# Patient Record
Sex: FEMALE | Race: BLACK OR AFRICAN AMERICAN | NOT HISPANIC OR LATINO | URBAN - NONMETROPOLITAN AREA
[De-identification: names, ages, dates, MRNs, and addresses within clinical notes are randomized per-mention and may not be internally consistent; named-entity substitution may affect disease eponyms.]

---

## 2018-09-25 PROBLEM — E11.9: Noted: 2018-09-25

## 2018-09-25 PROBLEM — H40.213: Noted: 2019-04-26

## 2018-09-25 PROBLEM — H52.03: Noted: 2017-06-27

## 2018-09-25 PROBLEM — H25.13: Noted: 2018-09-25

## 2019-04-26 ENCOUNTER — IMPORTED ENCOUNTER (OUTPATIENT)
Dept: URBAN - NONMETROPOLITAN AREA CLINIC 1 | Facility: CLINIC | Age: 64
End: 2019-04-26

## 2019-04-26 PROCEDURE — 99212 OFFICE O/P EST SF 10 MIN: CPT

## 2019-04-26 PROCEDURE — 92133 CPTRZD OPH DX IMG PST SGM ON: CPT

## 2019-04-26 NOTE — PATIENT DISCUSSION
TINO - Discussed diagnosis in detail with patient - IOP 07:09 04/26/19 -OCT ONH performed and reviewed w/pt ? possibly worse? OS  - Prevoius PACH: 500 OD and 492 OS - No gtts needed at this time- Continue to monitor every 3 months -refer to Shafranov Cataracts OU - Discussed diagnosis in detail with patient- Discussed signs and symptoms associated - Recommend UV protection - Continue to montior DM sans retinopathy - Dicussed diagnosis in detail with patient - Currently diet controlled per pt- Stressed importance of maintaining strict blood sugar control and how it affects health of the eyes- No BDR noted at this time. - Continue to monitor; 's Notes: MR - June 2018DFE  6/2018VF 6/21/18OCT [facility] 04/26/19Optos/Fundus Photos Gonio 12/28/2018PACH  9/20/18 - Aly ([phone])

## 2019-09-20 ENCOUNTER — IMPORTED ENCOUNTER (OUTPATIENT)
Dept: URBAN - NONMETROPOLITAN AREA CLINIC 1 | Facility: CLINIC | Age: 64
End: 2019-09-20

## 2019-09-20 PROCEDURE — 92020 GONIOSCOPY: CPT

## 2019-09-20 PROCEDURE — 99213 OFFICE O/P EST LOW 20 MIN: CPT

## 2019-09-20 NOTE — PATIENT DISCUSSION
NAG- Discussed diagnosis in detail with patient -IOP today was measured as 12OD and 13OS- No gtts needed at this time-Gonio done today 9-:OD: Narrow approach open to PTM indentation opens to CBB angle is not occludableOS: Narrow approach open to VIOLETA/PTM indentation opens to CBB angle may become occludable reccommend LPI OSCataracts OU - Discussed diagnosis in detail with patient- Discussed signs and symptoms associated - Recommend UV protection - Continue to montior DM sans retinopathy - Dicussed diagnosis in detail with patient - Currently diet controlled per pt- Stressed importance of maintaining strict blood sugar control and how it affects health of the eyes- No BDR noted at this time. - Continue to monitor; 's Notes: MR - June 2018DFE  6/2018VF 6/21/18OCT [facility] [place] 04/26/19Optos/Fundus Photos Gonio 12/28/2018PACH  9/20/18 - Aly ([phone])

## 2019-09-26 PROBLEM — H25.13: Noted: 2019-09-20

## 2019-09-26 PROBLEM — E11.9: Noted: 2019-09-20

## 2019-09-26 PROBLEM — H40.033: Noted: 2019-09-26

## 2019-09-26 PROBLEM — H52.03: Noted: 2019-09-20

## 2020-02-08 ENCOUNTER — HOSPITAL ENCOUNTER (INPATIENT)
Age: 65
LOS: 3 days | Discharge: HOME | DRG: 557 | End: 2020-02-11
Attending: INTERNAL MEDICINE | Admitting: INTERNAL MEDICINE
Payer: MEDICARE

## 2020-02-08 ENCOUNTER — APPOINTMENT (OUTPATIENT)
Dept: GENERAL RADIOLOGY | Age: 65
DRG: 557 | End: 2020-02-08
Attending: HOSPITALIST
Payer: MEDICARE

## 2020-02-08 PROBLEM — M62.82 RHABDOMYOLYSIS: Status: ACTIVE | Noted: 2020-02-08

## 2020-02-08 PROBLEM — N17.9 AKI (ACUTE KIDNEY INJURY) (HCC): Status: ACTIVE | Noted: 2020-02-08

## 2020-02-08 PROBLEM — R50.9 FEBRILE ILLNESS: Status: ACTIVE | Noted: 2020-02-08

## 2020-02-08 PROBLEM — R41.82 ALTERED MENTAL STATUS: Status: ACTIVE | Noted: 2020-02-08

## 2020-02-08 PROBLEM — R41.82 AMS (ALTERED MENTAL STATUS): Status: ACTIVE | Noted: 2020-02-08

## 2020-02-08 LAB
ALBUMIN SERPL-MCNC: 2.6 G/DL (ref 3.4–5)
ALBUMIN SERPL-MCNC: 2.8 G/DL (ref 3.4–5)
ALBUMIN/GLOB SERPL: 0.8 {RATIO} (ref 0.8–1.7)
ALBUMIN/GLOB SERPL: 0.8 {RATIO} (ref 0.8–1.7)
ALP SERPL-CCNC: 114 U/L (ref 45–117)
ALP SERPL-CCNC: 124 U/L (ref 45–117)
ALT SERPL-CCNC: 68 U/L (ref 13–56)
ALT SERPL-CCNC: 69 U/L (ref 13–56)
AMMONIA PLAS-SCNC: <10 UMOL/L (ref 11–32)
ANION GAP SERPL CALC-SCNC: 5 MMOL/L (ref 3–18)
ANION GAP SERPL CALC-SCNC: 7 MMOL/L (ref 3–18)
APPEARANCE UR: CLEAR
ARTERIAL PATENCY WRIST A: YES
AST SERPL-CCNC: 263 U/L (ref 10–38)
AST SERPL-CCNC: 290 U/L (ref 10–38)
BACTERIA URNS QL MICRO: ABNORMAL /HPF
BASE DEFICIT BLD-SCNC: 1 MMOL/L
BASOPHILS # BLD: 0 K/UL (ref 0–0.1)
BASOPHILS NFR BLD: 0 % (ref 0–2)
BDY SITE: ABNORMAL
BILIRUB SERPL-MCNC: 0.2 MG/DL (ref 0.2–1)
BILIRUB SERPL-MCNC: 0.3 MG/DL (ref 0.2–1)
BILIRUB UR QL: NEGATIVE
BODY TEMPERATURE: 98
BUN SERPL-MCNC: 20 MG/DL (ref 7–18)
BUN SERPL-MCNC: 21 MG/DL (ref 7–18)
BUN/CREAT SERPL: 15 (ref 12–20)
BUN/CREAT SERPL: 15 (ref 12–20)
CALCIUM SERPL-MCNC: 8 MG/DL (ref 8.5–10.1)
CALCIUM SERPL-MCNC: 8.2 MG/DL (ref 8.5–10.1)
CHLORIDE SERPL-SCNC: 111 MMOL/L (ref 100–111)
CHLORIDE SERPL-SCNC: 114 MMOL/L (ref 100–111)
CK SERPL-CCNC: 8854 U/L (ref 26–192)
CK SERPL-CCNC: 9276 U/L (ref 26–192)
CK SERPL-CCNC: 9476 U/L (ref 26–192)
CO2 SERPL-SCNC: 23 MMOL/L (ref 21–32)
CO2 SERPL-SCNC: 25 MMOL/L (ref 21–32)
COLOR UR: YELLOW
CREAT SERPL-MCNC: 1.3 MG/DL (ref 0.6–1.3)
CREAT SERPL-MCNC: 1.42 MG/DL (ref 0.6–1.3)
DIFFERENTIAL METHOD BLD: ABNORMAL
EOSINOPHIL # BLD: 0 K/UL (ref 0–0.4)
EOSINOPHIL NFR BLD: 0 % (ref 0–5)
EPITH CASTS URNS QL MICRO: ABNORMAL /LPF (ref 0–5)
ERYTHROCYTE [DISTWIDTH] IN BLOOD BY AUTOMATED COUNT: 14.6 % (ref 11.6–14.5)
GAS FLOW.O2 O2 DELIVERY SYS: ABNORMAL L/MIN
GLOBULIN SER CALC-MCNC: 3.1 G/DL (ref 2–4)
GLOBULIN SER CALC-MCNC: 3.3 G/DL (ref 2–4)
GLUCOSE SERPL-MCNC: 127 MG/DL (ref 74–99)
GLUCOSE SERPL-MCNC: 196 MG/DL (ref 74–99)
GLUCOSE UR STRIP.AUTO-MCNC: 100 MG/DL
HCO3 BLD-SCNC: 23.6 MMOL/L (ref 22–26)
HCT VFR BLD AUTO: 32.4 % (ref 35–45)
HGB BLD-MCNC: 10.4 G/DL (ref 12–16)
HGB UR QL STRIP: NEGATIVE
KETONES UR QL STRIP.AUTO: NEGATIVE MG/DL
LEUKOCYTE ESTERASE UR QL STRIP.AUTO: ABNORMAL
LYMPHOCYTES # BLD: 0.6 K/UL (ref 0.9–3.6)
LYMPHOCYTES NFR BLD: 12 % (ref 21–52)
MCH RBC QN AUTO: 29.5 PG (ref 24–34)
MCHC RBC AUTO-ENTMCNC: 32.1 G/DL (ref 31–37)
MCV RBC AUTO: 91.8 FL (ref 74–97)
MONOCYTES # BLD: 0.2 K/UL (ref 0.05–1.2)
MONOCYTES NFR BLD: 4 % (ref 3–10)
NEUTS SEG # BLD: 4.2 K/UL (ref 1.8–8)
NEUTS SEG NFR BLD: 84 % (ref 40–73)
NITRITE UR QL STRIP.AUTO: NEGATIVE
O2/TOTAL GAS SETTING VFR VENT: 0.21 %
PCO2 BLD: 37.4 MMHG (ref 35–45)
PH BLD: 7.41 [PH] (ref 7.35–7.45)
PH UR STRIP: 7 [PH] (ref 5–8)
PLATELET # BLD AUTO: 174 K/UL (ref 135–420)
PMV BLD AUTO: 10.8 FL (ref 9.2–11.8)
PO2 BLD: 71 MMHG (ref 80–100)
POTASSIUM SERPL-SCNC: 4.3 MMOL/L (ref 3.5–5.5)
POTASSIUM SERPL-SCNC: 4.6 MMOL/L (ref 3.5–5.5)
PROT SERPL-MCNC: 5.7 G/DL (ref 6.4–8.2)
PROT SERPL-MCNC: 6.1 G/DL (ref 6.4–8.2)
PROT UR STRIP-MCNC: NEGATIVE MG/DL
RBC # BLD AUTO: 3.53 M/UL (ref 4.2–5.3)
RBC #/AREA URNS HPF: ABNORMAL /HPF (ref 0–5)
SAO2 % BLD: 95 % (ref 92–97)
SERVICE CMNT-IMP: ABNORMAL
SODIUM SERPL-SCNC: 141 MMOL/L (ref 136–145)
SODIUM SERPL-SCNC: 144 MMOL/L (ref 136–145)
SP GR UR REFRACTOMETRY: 1.02 (ref 1–1.03)
SPECIMEN TYPE: ABNORMAL
T4 FREE SERPL-MCNC: 0.9 NG/DL (ref 0.7–1.5)
TOTAL RESP. RATE, ITRR: 16
TSH SERPL DL<=0.05 MIU/L-ACNC: 0.14 UIU/ML (ref 0.36–3.74)
UROBILINOGEN UR QL STRIP.AUTO: 0.2 EU/DL (ref 0.2–1)
WBC # BLD AUTO: 5 K/UL (ref 4.6–13.2)
WBC URNS QL MICRO: ABNORMAL /HPF (ref 0–4)

## 2020-02-08 PROCEDURE — 85025 COMPLETE CBC W/AUTO DIFF WBC: CPT

## 2020-02-08 PROCEDURE — 80307 DRUG TEST PRSMV CHEM ANLYZR: CPT

## 2020-02-08 PROCEDURE — 74011250636 HC RX REV CODE- 250/636: Performed by: INTERNAL MEDICINE

## 2020-02-08 PROCEDURE — 74011250637 HC RX REV CODE- 250/637: Performed by: PHYSICIAN ASSISTANT

## 2020-02-08 PROCEDURE — 80053 COMPREHEN METABOLIC PANEL: CPT

## 2020-02-08 PROCEDURE — 36600 WITHDRAWAL OF ARTERIAL BLOOD: CPT

## 2020-02-08 PROCEDURE — 74011250636 HC RX REV CODE- 250/636: Performed by: HOSPITALIST

## 2020-02-08 PROCEDURE — 71045 X-RAY EXAM CHEST 1 VIEW: CPT

## 2020-02-08 PROCEDURE — 84439 ASSAY OF FREE THYROXINE: CPT

## 2020-02-08 PROCEDURE — 74011250637 HC RX REV CODE- 250/637: Performed by: INTERNAL MEDICINE

## 2020-02-08 PROCEDURE — 82140 ASSAY OF AMMONIA: CPT

## 2020-02-08 PROCEDURE — 87040 BLOOD CULTURE FOR BACTERIA: CPT

## 2020-02-08 PROCEDURE — 81001 URINALYSIS AUTO W/SCOPE: CPT

## 2020-02-08 PROCEDURE — 36415 COLL VENOUS BLD VENIPUNCTURE: CPT

## 2020-02-08 PROCEDURE — 82803 BLOOD GASES ANY COMBINATION: CPT

## 2020-02-08 PROCEDURE — 74011000258 HC RX REV CODE- 258: Performed by: INTERNAL MEDICINE

## 2020-02-08 PROCEDURE — 82550 ASSAY OF CK (CPK): CPT

## 2020-02-08 PROCEDURE — 84443 ASSAY THYROID STIM HORMONE: CPT

## 2020-02-08 PROCEDURE — 65660000000 HC RM CCU STEPDOWN

## 2020-02-08 RX ORDER — VANCOMYCIN 2 GRAM/500 ML IN 0.9 % SODIUM CHLORIDE INTRAVENOUS
2000 ONCE
Status: COMPLETED | OUTPATIENT
Start: 2020-02-08 | End: 2020-02-08

## 2020-02-08 RX ORDER — DOCUSATE SODIUM 100 MG/1
100 CAPSULE, LIQUID FILLED ORAL
Status: DISCONTINUED | OUTPATIENT
Start: 2020-02-08 | End: 2020-02-11 | Stop reason: HOSPADM

## 2020-02-08 RX ORDER — NALOXONE HYDROCHLORIDE 0.4 MG/ML
0.4 INJECTION, SOLUTION INTRAMUSCULAR; INTRAVENOUS; SUBCUTANEOUS AS NEEDED
Status: DISCONTINUED | OUTPATIENT
Start: 2020-02-08 | End: 2020-02-11 | Stop reason: HOSPADM

## 2020-02-08 RX ORDER — TRAMADOL HYDROCHLORIDE 50 MG/1
50 TABLET ORAL
Status: DISCONTINUED | OUTPATIENT
Start: 2020-02-08 | End: 2020-02-11 | Stop reason: HOSPADM

## 2020-02-08 RX ORDER — QUETIAPINE FUMARATE 100 MG/1
100 TABLET, FILM COATED ORAL
COMMUNITY

## 2020-02-08 RX ORDER — DEXAMETHASONE SODIUM PHOSPHATE 100 MG/10ML
14 INJECTION INTRAMUSCULAR; INTRAVENOUS EVERY 6 HOURS
Status: DISCONTINUED | OUTPATIENT
Start: 2020-02-08 | End: 2020-02-08

## 2020-02-08 RX ORDER — GABAPENTIN 300 MG/1
600 CAPSULE ORAL 3 TIMES DAILY
Status: DISCONTINUED | OUTPATIENT
Start: 2020-02-08 | End: 2020-02-11 | Stop reason: HOSPADM

## 2020-02-08 RX ORDER — ATORVASTATIN CALCIUM 40 MG/1
40 TABLET, FILM COATED ORAL DAILY
Status: DISCONTINUED | OUTPATIENT
Start: 2020-02-08 | End: 2020-02-11 | Stop reason: HOSPADM

## 2020-02-08 RX ORDER — IPRATROPIUM BROMIDE AND ALBUTEROL SULFATE 2.5; .5 MG/3ML; MG/3ML
3 SOLUTION RESPIRATORY (INHALATION)
Status: DISCONTINUED | OUTPATIENT
Start: 2020-02-08 | End: 2020-02-11 | Stop reason: HOSPADM

## 2020-02-08 RX ORDER — ACETAMINOPHEN 325 MG/1
650 TABLET ORAL
Status: DISCONTINUED | OUTPATIENT
Start: 2020-02-08 | End: 2020-02-11 | Stop reason: HOSPADM

## 2020-02-08 RX ORDER — LANOLIN ALCOHOL/MO/W.PET/CERES
12 CREAM (GRAM) TOPICAL
Status: DISCONTINUED | OUTPATIENT
Start: 2020-02-08 | End: 2020-02-11 | Stop reason: HOSPADM

## 2020-02-08 RX ORDER — AMITRIPTYLINE HYDROCHLORIDE 10 MG/1
10 TABLET, FILM COATED ORAL
Status: DISCONTINUED | OUTPATIENT
Start: 2020-02-08 | End: 2020-02-11 | Stop reason: HOSPADM

## 2020-02-08 RX ORDER — POTASSIUM CHLORIDE 750 MG/1
10 CAPSULE, EXTENDED RELEASE ORAL DAILY
COMMUNITY

## 2020-02-08 RX ORDER — SODIUM CHLORIDE, SODIUM LACTATE, POTASSIUM CHLORIDE, CALCIUM CHLORIDE 600; 310; 30; 20 MG/100ML; MG/100ML; MG/100ML; MG/100ML
175 INJECTION, SOLUTION INTRAVENOUS CONTINUOUS
Status: DISCONTINUED | OUTPATIENT
Start: 2020-02-08 | End: 2020-02-08

## 2020-02-08 RX ORDER — LEVOTHYROXINE SODIUM 50 UG/1
50 TABLET ORAL
Status: DISCONTINUED | OUTPATIENT
Start: 2020-02-08 | End: 2020-02-11 | Stop reason: HOSPADM

## 2020-02-08 RX ORDER — QUETIAPINE FUMARATE 25 MG/1
50 TABLET, FILM COATED ORAL DAILY
Status: DISCONTINUED | OUTPATIENT
Start: 2020-02-08 | End: 2020-02-08

## 2020-02-08 RX ORDER — SODIUM CHLORIDE, SODIUM LACTATE, POTASSIUM CHLORIDE, CALCIUM CHLORIDE 600; 310; 30; 20 MG/100ML; MG/100ML; MG/100ML; MG/100ML
200 INJECTION, SOLUTION INTRAVENOUS CONTINUOUS
Status: DISCONTINUED | OUTPATIENT
Start: 2020-02-08 | End: 2020-02-08

## 2020-02-08 RX ORDER — ONDANSETRON 2 MG/ML
4 INJECTION INTRAMUSCULAR; INTRAVENOUS
Status: DISCONTINUED | OUTPATIENT
Start: 2020-02-08 | End: 2020-02-11 | Stop reason: HOSPADM

## 2020-02-08 RX ORDER — QUETIAPINE FUMARATE 25 MG/1
100 TABLET, FILM COATED ORAL
Status: DISCONTINUED | OUTPATIENT
Start: 2020-02-08 | End: 2020-02-11 | Stop reason: HOSPADM

## 2020-02-08 RX ORDER — POTASSIUM CHLORIDE 750 MG/1
10 TABLET, EXTENDED RELEASE ORAL DAILY
Status: DISCONTINUED | OUTPATIENT
Start: 2020-02-08 | End: 2020-02-11 | Stop reason: HOSPADM

## 2020-02-08 RX ORDER — LISINOPRIL 5 MG/1
5 TABLET ORAL DAILY
COMMUNITY

## 2020-02-08 RX ORDER — SODIUM CHLORIDE 9 MG/ML
150 INJECTION, SOLUTION INTRAVENOUS CONTINUOUS
Status: DISCONTINUED | OUTPATIENT
Start: 2020-02-08 | End: 2020-02-11 | Stop reason: HOSPADM

## 2020-02-08 RX ORDER — PANTOPRAZOLE SODIUM 40 MG/1
40 TABLET, DELAYED RELEASE ORAL DAILY
Status: DISCONTINUED | OUTPATIENT
Start: 2020-02-08 | End: 2020-02-11 | Stop reason: HOSPADM

## 2020-02-08 RX ORDER — PANTOPRAZOLE SODIUM 40 MG/1
40 TABLET, DELAYED RELEASE ORAL DAILY
COMMUNITY

## 2020-02-08 RX ORDER — SODIUM CHLORIDE, SODIUM LACTATE, POTASSIUM CHLORIDE, CALCIUM CHLORIDE 600; 310; 30; 20 MG/100ML; MG/100ML; MG/100ML; MG/100ML
1000 INJECTION, SOLUTION INTRAVENOUS CONTINUOUS
Status: DISPENSED | OUTPATIENT
Start: 2020-02-08 | End: 2020-02-08

## 2020-02-08 RX ORDER — OXYCODONE HYDROCHLORIDE 30 MG/1
30 TABLET ORAL
COMMUNITY

## 2020-02-08 RX ORDER — AMITRIPTYLINE HYDROCHLORIDE 10 MG/1
10 TABLET, FILM COATED ORAL
COMMUNITY

## 2020-02-08 RX ORDER — BACLOFEN 10 MG/1
10-20 TABLET ORAL 3 TIMES DAILY
COMMUNITY

## 2020-02-08 RX ORDER — LISINOPRIL 5 MG/1
5 TABLET ORAL DAILY
Status: DISCONTINUED | OUTPATIENT
Start: 2020-02-08 | End: 2020-02-11 | Stop reason: HOSPADM

## 2020-02-08 RX ADMIN — AMITRIPTYLINE HYDROCHLORIDE 10 MG: 10 TABLET, FILM COATED ORAL at 23:33

## 2020-02-08 RX ADMIN — SODIUM CHLORIDE 150 ML/HR: 900 INJECTION, SOLUTION INTRAVENOUS at 09:38

## 2020-02-08 RX ADMIN — GABAPENTIN 600 MG: 300 CAPSULE ORAL at 15:21

## 2020-02-08 RX ADMIN — VANCOMYCIN HYDROCHLORIDE 2000 MG: 10 INJECTION, POWDER, LYOPHILIZED, FOR SOLUTION INTRAVENOUS at 10:00

## 2020-02-08 RX ADMIN — TRAMADOL HYDROCHLORIDE 50 MG: 50 TABLET, FILM COATED ORAL at 17:32

## 2020-02-08 RX ADMIN — DEXAMETHASONE SODIUM PHOSPHATE 14 MG: 4 INJECTION, SOLUTION INTRA-ARTICULAR; INTRALESIONAL; INTRAMUSCULAR; INTRAVENOUS; SOFT TISSUE at 10:28

## 2020-02-08 RX ADMIN — LEVOTHYROXINE SODIUM 50 MCG: 0.03 TABLET ORAL at 09:32

## 2020-02-08 RX ADMIN — DEXAMETHASONE SODIUM PHOSPHATE 14 MG: 4 INJECTION, SOLUTION INTRA-ARTICULAR; INTRALESIONAL; INTRAMUSCULAR; INTRAVENOUS; SOFT TISSUE at 23:31

## 2020-02-08 RX ADMIN — DEXAMETHASONE SODIUM PHOSPHATE 14 MG: 4 INJECTION, SOLUTION INTRA-ARTICULAR; INTRALESIONAL; INTRAMUSCULAR; INTRAVENOUS; SOFT TISSUE at 17:08

## 2020-02-08 RX ADMIN — LISINOPRIL 5 MG: 5 TABLET ORAL at 09:32

## 2020-02-08 RX ADMIN — SODIUM CHLORIDE, SODIUM LACTATE, POTASSIUM CHLORIDE, AND CALCIUM CHLORIDE 1000 ML/HR: 600; 310; 30; 20 INJECTION, SOLUTION INTRAVENOUS at 07:00

## 2020-02-08 RX ADMIN — POTASSIUM CHLORIDE 10 MEQ: 10 TABLET, EXTENDED RELEASE ORAL at 09:32

## 2020-02-08 RX ADMIN — PANTOPRAZOLE SODIUM 40 MG: 40 TABLET, DELAYED RELEASE ORAL at 09:31

## 2020-02-08 RX ADMIN — GABAPENTIN 600 MG: 300 CAPSULE ORAL at 09:31

## 2020-02-08 RX ADMIN — GABAPENTIN 600 MG: 300 CAPSULE ORAL at 22:25

## 2020-02-08 RX ADMIN — ACETAMINOPHEN 650 MG: 325 TABLET, FILM COATED ORAL at 15:21

## 2020-02-08 RX ADMIN — ATORVASTATIN CALCIUM 40 MG: 40 TABLET, FILM COATED ORAL at 09:32

## 2020-02-08 RX ADMIN — SODIUM CHLORIDE, SODIUM LACTATE, POTASSIUM CHLORIDE, AND CALCIUM CHLORIDE 200 ML/HR: 600; 310; 30; 20 INJECTION, SOLUTION INTRAVENOUS at 07:55

## 2020-02-08 RX ADMIN — CEFTRIAXONE 2 G: 2 INJECTION, POWDER, FOR SOLUTION INTRAMUSCULAR; INTRAVENOUS at 09:38

## 2020-02-08 RX ADMIN — SODIUM CHLORIDE, SODIUM LACTATE, POTASSIUM CHLORIDE, AND CALCIUM CHLORIDE 175 ML/HR: 600; 310; 30; 20 INJECTION, SOLUTION INTRAVENOUS at 05:41

## 2020-02-08 RX ADMIN — QUETIAPINE FUMARATE 100 MG: 25 TABLET ORAL at 22:25

## 2020-02-08 NOTE — PROGRESS NOTES
Transport arrived from [facility]. Patient alert and oriented x4. Follows commands. Bilateral PIV. No skin issues. mepilex applied to sacrum. Unable to complete required documentation, patient very tired. Family will be here in the am  Bedside shift change report given to Yordy Mooney (oncoming nurse) by Bruno Hall RN   (offgoing nurse). Report included the following information SBAR, MAR and Recent Results.

## 2020-02-08 NOTE — PROGRESS NOTES
0915 call to dr Claudetta Noe, pt is currently npo, but has many PO meds ordered, clarification needed. 1830 pt has transfer order in.     1915 report called to [facility] for room 3001    1940 Bedside and Verbal shift change report given to renetta (oncoming nurse) by Lynette Weathers RN   (offgoing nurse). Report included the following information Kardex, MAR and Recent Results.

## 2020-02-08 NOTE — PROGRESS NOTES
Problem: Falls - Risk of  Goal: *Absence of Falls  Description  Document Dean Dunn Fall Risk and appropriate interventions in the flowsheet.   Outcome: Progressing Towards Goal  Note: Fall Risk Interventions:                                Problem: Patient Education: Go to Patient Education Activity  Goal: Patient/Family Education  Outcome: Progressing Towards Goal     Problem: Pain  Goal: *Control of Pain  Outcome: Progressing Towards Goal     Problem: Patient Education: Go to Patient Education Activity  Goal: Patient/Family Education  Outcome: Progressing Towards Goal

## 2020-02-08 NOTE — H&P
History and Physical    Patient: Melissa Galvan MRN: [id number]  SSN: [id number]    YOB: 1955  Age: 59 y.o. Sex: female      Subjective:      Melissa Galvan is a 59 y.o. female who is accepted to [place] [address] Unit as a Transfer from [facility] where Patient presented with complaints of AMS, Falls, and Fever. [facility] ICU Nursing Staff reports that Patient was talkative and exhibited full affect upon arrival and answered questions of Alertness and Orientation 3/3. However, upon sleeping, Nursing Staff reports that Patient is hypersomnolent and cannot easily be roused and only maintains partial focus momentarily before falling asleep again. Additional information cannot be solicited from Patient at this time. [facility] ER Physician Yusef Michelle M.D.) called to request Transfer for this Patient after Hospitalist(s) at his facility refused to admit Patient. Patient reportedly arrived at approximately 1800 EST on 2/07/2020 with a complaint of Altered Mental Status. Patient was noted to have Rhabdomyolysis with CK >6,000. Patient was noted to be Febrile (102.2°F) and UA, CXR, and CT Head were negative. Reportedly, Patient's Family refused Lumbar Puncture on Patient's behalf and Patient underwent CTA Head & Neck as well as MRI Head and MRV to evaluate for Venous Clot. [facility] ER Physician stated that Patient had a history of CVA and \"Central Venous Thrombus,\" clarifying that Patient had a history of a Venous Thrombus in her Brain. [facility] ER Physician reported a non-focal neurological examination and (-) Troponin. Notably, Patient reportedly had not had Antibiotics or Blood Cultures drawn during the phone conversation, which ended at approximately 0011 EST on 2/08/2020. [facility] ER Physician was told to inform Family Members that failure to have an LP could have disastrous effects if Patient did have a [address] infection.   [facility] ER Physician did not convey Family's rationale for refusal of LP.  Patient was accepted to be Transferred to [place] [address] Unit with instructions to administer empiric antibiotics for CNS Infection (IV Ceftriaxone 2 g, IV Vancomycin, and IV Dexasamethasone), as well as drawing Blood Cultures and checking a Serum Ammonia level. Additionally, Nursing Staff reports that Patient may only have received 500 mL of fluids while at [facility] ER for treatment of Rhabdomyolysis with CK >6,000 and no running fluids thereafter or during transfer. Notably, review of [facility] Medical Chart indicates that Family brought Patient in after being found down for an indeterminate amount of time and having aphasia. Notably, Patient has had a Cerebral Venous Clot, Hemorrhagic CVA, and Subdural Hematoma in the past and reportedly does have some word-searching. Patient received NeuroConsult in [facility] ER (which was not reported during Transfer) and Toxic Encephalopathy and Seizures were differential diagnoses for which Neurologist made provisions. Patient is admitted to [place] [address] Unit for management of Altered Mental Status (likely Metabolic Encephalopathy) with Febrile Illness of Unknown Origin, and Rhabdomyolysis. Past Medical History:   Diagnosis Date    Cerebral venous thrombosis     Depression     Diabetes mellitus, type 2 ([address]     Fibromyalgia     Hiatal hernia     HLD (hyperlipidemia)     Hypertension     Hypothyroidism     Insomnia     Intracerebral hemorrhage ([facility] 03/2015    Hemorrhagic CVA;  Anterior Inferior Right Frontal Lobe    Migraine     Mild diastolic dysfunction 73/57/5514    by TTE    Obesity, Class II, BMI 35-39.9, with comorbidity 12/14/2015    DONNA (obstructive sleep apnea)     Patient has CPAP, but Non-Compliant    Subarachnoid hemorrhage ([facility] 02/2015    Right Sylvian Fissure, Suprasellar, Prepontine, Ambient Cisterns, Right Lateral Convexity, & along the falx     Past Surgical History:   Procedure Laterality Date    HX CHOLECYSTECTOMY      HX COLONOSCOPY  2009  HX ENDOSCOPY  2010    EGD    HX HYSTERECTOMY      HX REFRACTIVE SURGERY Bilateral 1997      Family History   Problem Relation Age of Onset    Cancer Mother     Heart Disease Father     Hypertension Sister     Diabetes Sister      Social History     Tobacco Use    Smoking status: Never Smoker   Substance Use Topics    Alcohol use: No     Alcohol/week: 0.0 standard drinks      Patient is Very Somnolent and awakens only momentarily, but does not answer ROS. Prior to Admission medications    Medication Sig Start Date End Date Taking? Authorizing Provider   amitriptyline (ELAVIL) 10 mg tablet Take 10 mg by mouth nightly. Yes Provider, Historical   baclofen (LIORESAL) 10 mg tablet Take 10-20 mg by mouth three (3) times daily. Yes Provider, Historical   oxyCODONE IR (ROXICODONE) 30 mg immediate release tablet Take 30 mg by mouth every six (6) hours as needed for Pain. Yes Provider, Historical   lisinopril (PRINIVIL, ZESTRIL) 5 mg tablet Take 5 mg by mouth daily. Yes Provider, Historical   pantoprazole (PROTONIX) 40 mg tablet Take 40 mg by mouth daily. Yes Provider, Historical   potassium chloride SA (MICRO-K) 10 mEq capsule Take 10 mEq by mouth daily. Yes Provider, Historical   QUEtiapine (SEROQUEL) 100 mg tablet Take 100 mg by mouth nightly. Yes Provider, Historical   zolpidem (AMBIEN) 10 mg tablet Take  by mouth nightly as needed for Sleep. Provider, Historical   venlafaxine (EFFEXOR) 37.5 mg tablet Take 37.5 mg by mouth daily. Provider, Historical   fentaNYL (DURAGESIC) 25 mcg/hr PATCH 1 Patch by TransDERmal route every seventy-two (72) hours. Provider, Historical   diazepam (VALIUM) 10 mg tablet Take 0.5 Tabs by mouth every six (6) hours as needed for Anxiety. Max Daily Amount: 20 mg. 12/14/15   Nabeel English MD   traMADol Xiaotroy Key) 50 mg tablet Take 50 mg by mouth every eight (8) hours as needed for Pain.     Provider, Historical   ergocalciferol (VITAMIN D2) 50,000 unit capsule Take 50,000 Units by mouth every seven (7) days. Provider, Historical   DULoxetine (CYMBALTA) 60 mg capsule Take 60 mg by mouth daily. Provider, Historical   QUEtiapine (SEROQUEL) 50 mg tablet Take 50 mg by mouth daily. Provider, Historical   levothyroxine (SYNTHROID) 50 mcg tablet Take  by mouth Daily (before breakfast). Provider, Historical   gabapentin (NEURONTIN) 300 mg capsule Take 600 mg by mouth three (3) times daily. Indications: take two pills 3 times a day    Provider, Historical   esomeprazole (NEXIUM) 40 mg capsule Take 40 mg by mouth two (2) times a day. Provider, Historical   atorvastatin (LIPITOR) 40 mg tablet Take  by mouth daily. Provider, Historical        No Known Allergies    Review of Systems:  Patient is not answering questions in her hypersomnolent state. Objective:     Vitals:    02/08/20 0600 02/08/20 0700 02/08/20 0800 02/08/20 0836   BP: 133/75 148/80 (!) 128/105    Pulse: 80 73 74    Resp: 14 14 17    Temp:   98 °F (36.7 °C)    SpO2: 98% 99% 100%    Weight:    90.6 kg (199 lb 12.8 oz)        Physical Exam:  General:  Older Adult -American female lying in bed in no acute distress  HEENT:  Atraumatic, normocephalic; Pupils equally round and reactive to light; Extraocular muscles intact; Moist Oropharynx without erythema, edema, or exudates  Neck:  No Bruits; No Lymphadenopathy  Chest:  No pectus carinatum;  No pectus excavatum  Cardiovascular:  Regular rate and rhythm without rubs, gallops, or murmurs  Respiratory:  Clear to Auscultation Bilaterally without wheezes, rales, or rhonchi; normal effort of breathing  Abdominal:  Soft, non-tense, non-tender abdomen; BS present without guarding, rebound, or masses  :  Deferred  Extremities:  Pulses 2+ x4 without edema, clubbing, or cyanosis  Musculoskeletal:  (+) Patient is not cooperative with Strength Testing  Integument:  No rash on face, forearms, or legs  Neurological:  (+) A&O x0/4 (Patient is not answering questions); (+) Patient does not cooperate with Cranial Nerve testing   Psychiatric:  Affect is (+) Moderately to Severely Constricted; (+) Language is absent; (+) Behavior is Moderately to Severely Somnolent with resistance to waking with minimal awakening with (+) Moderate Auditory stimulus and (+) Mild to Moderate tactile stimulus    Assessment:     Hospital Problems  Date Reviewed: 2/8/2020          Codes Class Noted POA    * (Principal) Altered mental status ICD-10-CM: R41.82  ICD-9-CM: 780.97  2/8/2020 Yes        Rhabdomyolysis ICD-10-CM: M62.82  ICD-9-CM: 728.88  2/8/2020 Yes        Febrile illness ICD-10-CM: R50.9  ICD-9-CM: 780.60  2/8/2020 Yes        MIKE (acute kidney injury) ([facility] ICD-10-CM: N17.9  ICD-9-CM: 584.9  2/8/2020 Yes        Hypertension ICD-10-CM: I10  ICD-9-CM: 401.9  12/14/2015 Yes        Hypothyroidism ICD-10-CM: E03.9  ICD-9-CM: 244.9  Unknown Yes        Diabetes ([facility] ICD-10-CM: E11.9  ICD-9-CM: 250.00  Unknown Yes        Migraine ICD-10-CM: C18.915  ICD-9-CM: 346.90  Unknown Yes        Intracerebral hemorrhage ([facility] ICD-10-CM: I61.9  ICD-9-CM: 602  12/14/2015 Yes        SAH (subarachnoid hemorrhage) ([facility] ICD-10-CM: I60.9  ICD-9-CM: 834  11/4/2015 Yes              Plan:     Continue IV Ceftriaxone (2 g/24 hours), IV Vancomycin, and Dexamethasone q6 hours. Recheck CK q6 hours. IV fluids 200 mL/hr after initial 1 L bolus of LR. Consider Sodium bicarbonate therapy if CK worsens. Continue home medications for Depression, HLD, Peripheral Neuropathy, HTN, and GERD. HOLD Baclofen, Oxycodone, and Zolpidem. Consider holding Patient's more sedating medication. DVT mechanoprophylaxis. CT, CTA, MRI, MRV reportedly negative for source of infection. Given history of Cerebral Venous Thrombosis, Hemorrhagic CVA (Intraparenchymal Hemorrhage), and Subdural Hematoma, it is possible that Patient does have some Parasomnia or otherwise abnormal wake-and-sleep cycles.   Alternatively, Patient may have delayed emergence of a seizure disorder.     Signed By: Anat Phipps,      February 8, 2020

## 2020-02-09 PROBLEM — R50.9 FEBRILE ILLNESS: Status: RESOLVED | Noted: 2020-02-08 | Resolved: 2020-02-09

## 2020-02-09 PROBLEM — N39.0 UTI (URINARY TRACT INFECTION): Status: ACTIVE | Noted: 2020-02-09

## 2020-02-09 PROBLEM — Z86.73 HISTORY OF CVA (CEREBROVASCULAR ACCIDENT): Status: ACTIVE | Noted: 2020-02-09

## 2020-02-09 LAB
ALBUMIN SERPL-MCNC: 2.5 G/DL (ref 3.4–5)
ALBUMIN SERPL-MCNC: 2.7 G/DL (ref 3.4–5)
ALBUMIN/GLOB SERPL: 0.8 {RATIO} (ref 0.8–1.7)
ALBUMIN/GLOB SERPL: 0.8 {RATIO} (ref 0.8–1.7)
ALP SERPL-CCNC: 103 U/L (ref 45–117)
ALP SERPL-CCNC: 110 U/L (ref 45–117)
ALT SERPL-CCNC: 76 U/L (ref 13–56)
ALT SERPL-CCNC: 76 U/L (ref 13–56)
AMPHET UR QL SCN: NEGATIVE
ANION GAP SERPL CALC-SCNC: 6 MMOL/L (ref 3–18)
ANION GAP SERPL CALC-SCNC: 8 MMOL/L (ref 3–18)
AST SERPL-CCNC: 242 U/L (ref 10–38)
AST SERPL-CCNC: 275 U/L (ref 10–38)
BARBITURATES UR QL SCN: NEGATIVE
BENZODIAZ UR QL: NEGATIVE
BILIRUB SERPL-MCNC: 0.2 MG/DL (ref 0.2–1)
BILIRUB SERPL-MCNC: 0.2 MG/DL (ref 0.2–1)
BUN SERPL-MCNC: 20 MG/DL (ref 7–18)
BUN SERPL-MCNC: 21 MG/DL (ref 7–18)
BUN/CREAT SERPL: 16 (ref 12–20)
BUN/CREAT SERPL: 16 (ref 12–20)
CALCIUM SERPL-MCNC: 7.8 MG/DL (ref 8.5–10.1)
CALCIUM SERPL-MCNC: 8.1 MG/DL (ref 8.5–10.1)
CANNABINOIDS UR QL SCN: NEGATIVE
CHLORIDE SERPL-SCNC: 115 MMOL/L (ref 100–111)
CHLORIDE SERPL-SCNC: 116 MMOL/L (ref 100–111)
CK SERPL-CCNC: 7017 U/L (ref 26–192)
CO2 SERPL-SCNC: 20 MMOL/L (ref 21–32)
CO2 SERPL-SCNC: 21 MMOL/L (ref 21–32)
COCAINE UR QL SCN: NEGATIVE
CREAT SERPL-MCNC: 1.29 MG/DL (ref 0.6–1.3)
CREAT SERPL-MCNC: 1.29 MG/DL (ref 0.6–1.3)
EST. AVERAGE GLUCOSE BLD GHB EST-MCNC: 123 MG/DL
GLOBULIN SER CALC-MCNC: 3 G/DL (ref 2–4)
GLOBULIN SER CALC-MCNC: 3.2 G/DL (ref 2–4)
GLUCOSE BLD STRIP.AUTO-MCNC: 103 MG/DL (ref 70–110)
GLUCOSE BLD STRIP.AUTO-MCNC: 139 MG/DL (ref 70–110)
GLUCOSE BLD STRIP.AUTO-MCNC: 142 MG/DL (ref 70–110)
GLUCOSE SERPL-MCNC: 134 MG/DL (ref 74–99)
GLUCOSE SERPL-MCNC: 145 MG/DL (ref 74–99)
HBA1C MFR BLD: 5.9 % (ref 4.2–5.6)
HDSCOM,HDSCOM: NORMAL
METHADONE UR QL: NEGATIVE
OPIATES UR QL: NEGATIVE
PCP UR QL: NEGATIVE
POTASSIUM SERPL-SCNC: 4.2 MMOL/L (ref 3.5–5.5)
POTASSIUM SERPL-SCNC: 4.4 MMOL/L (ref 3.5–5.5)
PROT SERPL-MCNC: 5.5 G/DL (ref 6.4–8.2)
PROT SERPL-MCNC: 5.9 G/DL (ref 6.4–8.2)
SODIUM SERPL-SCNC: 143 MMOL/L (ref 136–145)
SODIUM SERPL-SCNC: 143 MMOL/L (ref 136–145)

## 2020-02-09 PROCEDURE — 83036 HEMOGLOBIN GLYCOSYLATED A1C: CPT

## 2020-02-09 PROCEDURE — 36415 COLL VENOUS BLD VENIPUNCTURE: CPT

## 2020-02-09 PROCEDURE — 74011000258 HC RX REV CODE- 258: Performed by: INTERNAL MEDICINE

## 2020-02-09 PROCEDURE — 82550 ASSAY OF CK (CPK): CPT

## 2020-02-09 PROCEDURE — 74011250637 HC RX REV CODE- 250/637: Performed by: INTERNAL MEDICINE

## 2020-02-09 PROCEDURE — 65660000000 HC RM CCU STEPDOWN

## 2020-02-09 PROCEDURE — 82962 GLUCOSE BLOOD TEST: CPT

## 2020-02-09 PROCEDURE — 74011250636 HC RX REV CODE- 250/636: Performed by: INTERNAL MEDICINE

## 2020-02-09 PROCEDURE — 80053 COMPREHEN METABOLIC PANEL: CPT

## 2020-02-09 PROCEDURE — 74011250637 HC RX REV CODE- 250/637: Performed by: PHYSICIAN ASSISTANT

## 2020-02-09 RX ORDER — MAGNESIUM SULFATE 100 %
4 CRYSTALS MISCELLANEOUS AS NEEDED
Status: DISCONTINUED | OUTPATIENT
Start: 2020-02-09 | End: 2020-02-11 | Stop reason: HOSPADM

## 2020-02-09 RX ORDER — DEXTROSE MONOHYDRATE 100 MG/ML
125-250 INJECTION, SOLUTION INTRAVENOUS AS NEEDED
Status: DISCONTINUED | OUTPATIENT
Start: 2020-02-09 | End: 2020-02-11 | Stop reason: HOSPADM

## 2020-02-09 RX ORDER — INSULIN LISPRO 100 [IU]/ML
INJECTION, SOLUTION INTRAVENOUS; SUBCUTANEOUS
Status: DISCONTINUED | OUTPATIENT
Start: 2020-02-09 | End: 2020-02-11 | Stop reason: HOSPADM

## 2020-02-09 RX ADMIN — LEVOTHYROXINE SODIUM 50 MCG: 0.03 TABLET ORAL at 10:51

## 2020-02-09 RX ADMIN — CEFTRIAXONE 2 G: 2 INJECTION, POWDER, FOR SOLUTION INTRAMUSCULAR; INTRAVENOUS at 10:51

## 2020-02-09 RX ADMIN — GABAPENTIN 600 MG: 300 CAPSULE ORAL at 10:51

## 2020-02-09 RX ADMIN — ATORVASTATIN CALCIUM 40 MG: 40 TABLET, FILM COATED ORAL at 10:51

## 2020-02-09 RX ADMIN — VANCOMYCIN HYDROCHLORIDE 1750 MG: 100 INJECTION, POWDER, LYOPHILIZED, FOR SOLUTION INTRAVENOUS at 12:09

## 2020-02-09 RX ADMIN — GABAPENTIN 600 MG: 300 CAPSULE ORAL at 17:35

## 2020-02-09 RX ADMIN — AMITRIPTYLINE HYDROCHLORIDE 10 MG: 10 TABLET, FILM COATED ORAL at 23:33

## 2020-02-09 RX ADMIN — LISINOPRIL 5 MG: 5 TABLET ORAL at 10:51

## 2020-02-09 RX ADMIN — POTASSIUM CHLORIDE 10 MEQ: 10 TABLET, EXTENDED RELEASE ORAL at 10:51

## 2020-02-09 RX ADMIN — DEXAMETHASONE SODIUM PHOSPHATE 14 MG: 4 INJECTION, SOLUTION INTRA-ARTICULAR; INTRALESIONAL; INTRAMUSCULAR; INTRAVENOUS; SOFT TISSUE at 12:09

## 2020-02-09 RX ADMIN — PANTOPRAZOLE SODIUM 40 MG: 40 TABLET, DELAYED RELEASE ORAL at 10:51

## 2020-02-09 RX ADMIN — DEXAMETHASONE SODIUM PHOSPHATE 14 MG: 4 INJECTION, SOLUTION INTRA-ARTICULAR; INTRALESIONAL; INTRAMUSCULAR; INTRAVENOUS; SOFT TISSUE at 05:15

## 2020-02-09 RX ADMIN — GABAPENTIN 600 MG: 300 CAPSULE ORAL at 23:33

## 2020-02-09 RX ADMIN — QUETIAPINE FUMARATE 100 MG: 25 TABLET ORAL at 23:34

## 2020-02-09 RX ADMIN — TRAMADOL HYDROCHLORIDE 50 MG: 50 TABLET, FILM COATED ORAL at 10:51

## 2020-02-09 NOTE — PROGRESS NOTES
Problem: Falls - Risk of  Goal: *Absence of Falls  Description  Document Wendi Aragon Fall Risk and appropriate interventions in the flowsheet. Outcome: Progressing Towards Goal  Note: Fall Risk Interventions:  Mobility Interventions: Patient to call before getting OOB         Medication Interventions: Patient to call before getting OOB    Elimination Interventions: Call light in reach    History of Falls Interventions: Bed/chair exit alarm         Problem: Patient Education: Go to Patient Education Activity  Goal: Patient/Family Education  Outcome: Progressing Towards Goal     Problem: Pain  Goal: *Control of Pain  Outcome: Progressing Towards Goal     Problem: Patient Education: Go to Patient Education Activity  Goal: Patient/Family Education  Outcome: Progressing Towards Goal     Problem: Risk for Spread of Infection  Goal: Prevent transmission of infectious organism to others  Description  Prevent the transmission of infectious organisms to other patients, staff members, and visitors.   Outcome: Progressing Towards Goal     Problem: Patient Education:  Go to Education Activity  Goal: Patient/Family Education  Outcome: Progressing Towards Goal     Problem: General Medical Care Plan  Goal: *Vital signs within specified parameters  Outcome: Progressing Towards Goal  Goal: *Labs within defined limits  Outcome: Progressing Towards Goal  Goal: *Absence of infection signs and symptoms  Outcome: Progressing Towards Goal  Goal: *Optimal pain control at patient's stated goal  Outcome: Progressing Towards Goal  Goal: *Skin integrity maintained  Outcome: Progressing Towards Goal  Goal: *Fluid volume balance  Outcome: Progressing Towards Goal  Goal: *Optimize nutritional status  Outcome: Progressing Towards Goal  Goal: *Anxiety reduced or absent  Outcome: Progressing Towards Goal  Goal: *Progressive mobility and function (eg: ADL's)  Outcome: Progressing Towards Goal

## 2020-02-09 NOTE — ROUTINE PROCESS
Patient called with concern of redness hotness and swelling of cheeks after administration of vancomycin. Patient is afebrie and VS WNL. ... Assessment shows redness to cheeks (R> L), no additional areas of color change. .. Paged PA Reprogle and updated. ..

## 2020-02-09 NOTE — PROGRESS NOTES
[address] pt care [facility]. Pt in bed, alert and oriented x 4. Not in any form of distress. Denies pain. Frequent use items and call bell within reach. Verbalized understanding to call for assistance. Bed locked in lowest position. Droplet isolation continued. Patient remains hypersomnolent and cannot easily be roused throughout the night. Patient does wake up when stimulated multiple times. Patient denies any pain or discomfort. Not in any form of distress. Droplet isolation continued. Bedside and Verbal shift change report given to Ruben Peters RN (oncoming nurse) by Antelmo Greenwood RN (offgoing nurse). Report included the following information SBAR, Kardex, Intake/Output, MAR and Recent Results.

## 2020-02-09 NOTE — PROGRESS NOTES
Internal Medicine Progress Note    Patient's Name: Whitney Escalona  Admit Date: 2/8/2020  Length of Stay: 1      Assessment/Plan     Principal Problem:    Altered mental status (2/8/2020)    Active Problems:    Rhabdomyolysis (2/8/2020)      MIKE (acute kidney injury) ([facility] (2/8/2020)      Hypothyroidism ()      Diabetes ([facility] ()      Migraine ()      Hypertension (12/14/2015)      History of CVA (cerebrovascular accident) (2/9/2020)      UTI (urinary tract infection) (2/9/2020)      AMS  - resolved  - afebrile, no s/sx meningitis  - d/c vanc and decadron  - continue rocephin for UTI    Rhabdo  - IV fluids  - daily CK  - monitor BMP    MIKE  - resolved    UTI  - cont rocephin      - Cont acceptable home medications for chronic conditions   - DVT protocol    I have personally reviewed all pertinent labs and films that have officially resulted over the last 24 hours. I have personally checked for all pending labs that are awaiting final results. HPI     Per H&P, Deepti Villegas is a 59 y.o. female who [facility] Patient presented with complaints of AMS, Falls, and Fever. [facility] ICU Nursing Staff reports that Patient was talkative and exhibited full affect upon arrival and answered questions of Alertness and Orientation 3/3. However, upon sleeping, Nursing Staff reports that Patient is hypersomnolent and cannot easily be roused and only maintains partial focus momentarily before falling asleep again. Additional information cannot be solicited from Patient at this time.     [facility] ER Physician Jesús Dubon M.D.) called to request Transfer for this Patient after Hospitalist(s) at his facility refused to admit Patient. Patient reportedly arrived at approximately 1800 EST on 2/07/2020 with a complaint of Altered Mental Status. Patient was noted to have Rhabdomyolysis with CK >6,000.   Patient was noted to be Febrile (102.2°F) and UA, CXR, and CT Head were negative. Reportedly, Patient's Family refused Lumbar Puncture on Patient's behalf and Patient underwent CTA Head & Neck as well as MRI Head and MRV to evaluate for Venous Clot. [facility] ER Physician stated that Patient had a history of CVA and \"Central Venous Thrombus,\" clarifying that Patient had a history of a Venous Thrombus in her Brain. [facility] ER Physician reported a non-focal neurological examination and (-) Troponin. Notably, Patient reportedly had not had Antibiotics or Blood Cultures drawn during the phone conversation, which ended at approximately 0011 EST on 2/08/2020. [facility] ER Physician was told to inform Family Members that failure to have an LP could have disastrous effects if Patient did have a [address] infection. [facility] ER Physician did not convey Family's rationale for refusal of LP. Patient was accepted to be Transferred to [place] [address] Unit with instructions to administer empiric antibiotics for CNS Infection (IV Ceftriaxone 2 g, IV Vancomycin, and IV Dexasamethasone), as well as drawing Blood Cultures and checking a Serum Ammonia level. Additionally, Nursing Staff reports that Patient may only have received 500 mL of fluids while at X ER for treatment of Rhabdomyolysis with CK >6,000 and no running fluids thereafter or during transfer.     Notably, review of [facility] Medical Chart indicates that Family brought Patient in after being found down for an indeterminate amount of time and having aphasia. Notably, Patient has had a Cerebral Venous Clot, Hemorrhagic CVA, and Subdural Hematoma in the past and reportedly does have some word-searching.   Patient received NeuroConsult in [facility] ER (which was not reported during Transfer) and Toxic Encephalopathy and Seizures were differential diagnoses for which Neurologist made provisions.     Patient is admitted to [place] [address] Unit for management of Altered Mental Status (likely Metabolic Encephalopathy) with Febrile Illness of Unknown Origin, and Rhabdomyolysis. [facility]     Patient has remained afebrile. WBC wnl. AMS resolved. UDS negative, UA with bacteriuria. MIKE resolved. CK trending down with IV fluids. Subjective     Pt s/e @ bedside. No major events overnight. Pt offers no complaints this AM. Denies CP or SOB. Denies abd pain, nvd. Objective     Visit Vitals  /89 (BP 1 Location: Right arm, BP Patient Position: At rest)   Pulse 87   Temp 98 °F (36.7 °C)   Resp 15   Ht 5' 3\" (1.6 m)   Wt 90.6 kg (199 lb 12.8 oz)   SpO2 100%   BMI 35.39 kg/m²       Physical Exam:  General Appearance: NAD, conversant  HENT: normocephalic/atraumatic, moist mucus membranes  Neck: No JVD, supple, no nuchal rigidity  Lungs: CTA with normal respiratory effort  CV: RRR, no m/r/g  Abdomen: soft, non-tender, normal bowel sounds  Extremities: no cyanosis, no peripheral edema  Neuro: No focal deficits, motor/sensory intact  Skin: Normal color, intact      Intake and Output:  Current Shift:  02/09 0701 - 02/09 1900  In: 480 [P.O.:480]  Out: 900 [Urine:900]  Last three shifts:  02/07 1901 - 02/09 0700  In: 5443.3 [P.O.:800; I.V.:4643.3]  Out: 2150 [Urine:2150]    Lab/Data Reviewed:  BMP:   Lab Results   Component Value Date/Time     02/09/2020 03:50 AM    K 4.4 02/09/2020 03:50 AM     (H) 02/09/2020 03:50 AM    CO2 20 (L) 02/09/2020 03:50 AM    AGAP 8 02/09/2020 03:50 AM     (H) 02/09/2020 03:50 AM    BUN 21 (H) 02/09/2020 03:50 AM    CREA 1.29 02/09/2020 03:50 AM    GFRAA 50 (L) 02/09/2020 03:50 AM    GFRNA 42 (L) 02/09/2020 03:50 AM     All Cardiac Markers in the last 24 hours:   Lab Results   Component Value Date/Time    CPK 7,017 (H) 02/09/2020 03:50 AM    CPK 8,854 (H) 02/08/2020 03:00 PM       Imaging Reviewed:  No results found.     Medications Reviewed:  Current Facility-Administered Medications   Medication Dose Route Frequency    insulin lispro (HUMALOG) injection   SubCUTAneous QID AFTER MEALS    glucose chewable tablet 16 g  4 Tab Oral PRN    glucagon (GLUCAGEN) injection 1 mg  1 mg IntraMUSCular PRN    dextrose 10% infusion 125-250 mL  125-250 mL IntraVENous PRN    [START ON 2/10/2020] cefTRIAXone (ROCEPHIN) 1 g in 0.9% sodium chloride (MBP/ADV) 50 mL MBP  1 g IntraVENous Q24H    amitriptyline (ELAVIL) tablet 10 mg  10 mg Oral QHS    atorvastatin (LIPITOR) tablet 40 mg  40 mg Oral DAILY    gabapentin (NEURONTIN) capsule 600 mg  600 mg Oral TID    levothyroxine (SYNTHROID) tablet 50 mcg  50 mcg Oral ACB    lisinopril (PRINIVIL, ZESTRIL) tablet 5 mg  5 mg Oral DAILY    pantoprazole (PROTONIX) tablet 40 mg  40 mg Oral DAILY    QUEtiapine (SEROquel) tablet 100 mg  100 mg Oral QHS    potassium chloride (KLOR-CON) tablet 10 mEq  10 mEq Oral DAILY    ondansetron (ZOFRAN) injection 4 mg  4 mg IntraVENous Q4H PRN    acetaminophen (TYLENOL) tablet 650 mg  650 mg Oral Q6H PRN    docusate sodium (COLACE) capsule 100 mg  100 mg Oral BID PRN    melatonin tablet 12 mg  12 mg Oral QHS PRN    albuterol-ipratropium (DUO-NEB) 2.5 MG-0.5 MG/3 ML  3 mL Nebulization Q6H PRN    0.9% sodium chloride infusion  150 mL/hr IntraVENous CONTINUOUS    traMADol (ULTRAM) tablet 50 mg  50 mg Oral Q8H PRN    naloxone (NARCAN) injection 0.4 mg  0.4 mg IntraVENous PRN         Aaron Ariza PA-C  [facility]  Hospitalist Division  Office:  [phone]  Pager: [phone]

## 2020-02-09 NOTE — PROGRESS NOTES
Problem: Discharge Planning  Goal: *Discharge to safe environment  Outcome: Progressing Towards Goal   Plan home    Reason for Admission:   ams                   RUR Score:      15%               Plan for utilizing home health:   no                       Current Advanced Directive/Advance Care Plan: none                         Transition of Care Plan:   Spoke with pt,she is A&O x4. She lives in [place] with her dtr nikita. She is independent with adls and amb, with a cane. Has a walker, does not use it. Also has cpap she does not use. Has used  services in past. Has been to [facility] in [place] in past. Has 6 steps to enter home. Her dtr will transport home. pcp Dr Danny Buckner saw in [place]. Demographics correct  She designates her spouse for dcp, she does not live with him but they are . Plan home      Patient has designated _________spouse______________ to participate in his/her discharge plan and to receive any needed information. Name: Frank Riley   Address:  Phone number: [phone]    Care Management Interventions  PCP Verified by CM: Yes  Palliative Care Criteria Met (RRAT>21 & CHF Dx)?: No  Mode of Transport at Discharge: Other (see comment)  Transition of Care Consult (CM Consult): Discharge Planning  Discharge Durable Medical Equipment: No  Physical Therapy Consult: No  Occupational Therapy Consult: No  Speech Therapy Consult: No  Current Support Network:  Other  Confirm Follow Up Transport: Family  Discharge Location  Discharge Placement: Home

## 2020-02-10 LAB
ALBUMIN SERPL-MCNC: 2.5 G/DL (ref 3.4–5)
ALBUMIN SERPL-MCNC: 2.6 G/DL (ref 3.4–5)
ALBUMIN/GLOB SERPL: 0.8 {RATIO} (ref 0.8–1.7)
ALBUMIN/GLOB SERPL: 0.8 {RATIO} (ref 0.8–1.7)
ALP SERPL-CCNC: 107 U/L (ref 45–117)
ALP SERPL-CCNC: 107 U/L (ref 45–117)
ALT SERPL-CCNC: 84 U/L (ref 13–56)
ALT SERPL-CCNC: 86 U/L (ref 13–56)
ANION GAP SERPL CALC-SCNC: 11 MMOL/L (ref 3–18)
ANION GAP SERPL CALC-SCNC: 6 MMOL/L (ref 3–18)
AST SERPL-CCNC: 202 U/L (ref 10–38)
AST SERPL-CCNC: 245 U/L (ref 10–38)
BILIRUB SERPL-MCNC: 0.3 MG/DL (ref 0.2–1)
BILIRUB SERPL-MCNC: 0.3 MG/DL (ref 0.2–1)
BUN SERPL-MCNC: 19 MG/DL (ref 7–18)
BUN SERPL-MCNC: 21 MG/DL (ref 7–18)
BUN/CREAT SERPL: 14 (ref 12–20)
BUN/CREAT SERPL: 16 (ref 12–20)
CALCIUM SERPL-MCNC: 7.9 MG/DL (ref 8.5–10.1)
CALCIUM SERPL-MCNC: 8.2 MG/DL (ref 8.5–10.1)
CHLORIDE SERPL-SCNC: 116 MMOL/L (ref 100–111)
CHLORIDE SERPL-SCNC: 117 MMOL/L (ref 100–111)
CK SERPL-CCNC: 5075 U/L (ref 26–192)
CO2 SERPL-SCNC: 18 MMOL/L (ref 21–32)
CO2 SERPL-SCNC: 23 MMOL/L (ref 21–32)
CREAT SERPL-MCNC: 1.3 MG/DL (ref 0.6–1.3)
CREAT SERPL-MCNC: 1.4 MG/DL (ref 0.6–1.3)
GLOBULIN SER CALC-MCNC: 3 G/DL (ref 2–4)
GLOBULIN SER CALC-MCNC: 3.2 G/DL (ref 2–4)
GLUCOSE BLD STRIP.AUTO-MCNC: 118 MG/DL (ref 70–110)
GLUCOSE BLD STRIP.AUTO-MCNC: 123 MG/DL (ref 70–110)
GLUCOSE BLD STRIP.AUTO-MCNC: 144 MG/DL (ref 70–110)
GLUCOSE BLD STRIP.AUTO-MCNC: 98 MG/DL (ref 70–110)
GLUCOSE SERPL-MCNC: 114 MG/DL (ref 74–99)
GLUCOSE SERPL-MCNC: 120 MG/DL (ref 74–99)
POTASSIUM SERPL-SCNC: 3.7 MMOL/L (ref 3.5–5.5)
POTASSIUM SERPL-SCNC: 3.9 MMOL/L (ref 3.5–5.5)
PROT SERPL-MCNC: 5.5 G/DL (ref 6.4–8.2)
PROT SERPL-MCNC: 5.8 G/DL (ref 6.4–8.2)
SODIUM SERPL-SCNC: 145 MMOL/L (ref 136–145)
SODIUM SERPL-SCNC: 146 MMOL/L (ref 136–145)

## 2020-02-10 PROCEDURE — 80053 COMPREHEN METABOLIC PANEL: CPT

## 2020-02-10 PROCEDURE — 36415 COLL VENOUS BLD VENIPUNCTURE: CPT

## 2020-02-10 PROCEDURE — 74011250636 HC RX REV CODE- 250/636: Performed by: INTERNAL MEDICINE

## 2020-02-10 PROCEDURE — 82550 ASSAY OF CK (CPK): CPT

## 2020-02-10 PROCEDURE — 74011250637 HC RX REV CODE- 250/637: Performed by: INTERNAL MEDICINE

## 2020-02-10 PROCEDURE — 82962 GLUCOSE BLOOD TEST: CPT

## 2020-02-10 PROCEDURE — 74011250636 HC RX REV CODE- 250/636: Performed by: HOSPITALIST

## 2020-02-10 PROCEDURE — 74011250636 HC RX REV CODE- 250/636: Performed by: PHYSICIAN ASSISTANT

## 2020-02-10 PROCEDURE — 65660000000 HC RM CCU STEPDOWN

## 2020-02-10 PROCEDURE — 74011000258 HC RX REV CODE- 258: Performed by: PHYSICIAN ASSISTANT

## 2020-02-10 RX ORDER — HEPARIN SODIUM 5000 [USP'U]/ML
5000 INJECTION, SOLUTION INTRAVENOUS; SUBCUTANEOUS EVERY 8 HOURS
Status: DISCONTINUED | OUTPATIENT
Start: 2020-02-10 | End: 2020-02-11 | Stop reason: HOSPADM

## 2020-02-10 RX ORDER — SODIUM BICARBONATE 650 MG/1
650 TABLET ORAL 3 TIMES DAILY
Status: DISCONTINUED | OUTPATIENT
Start: 2020-02-10 | End: 2020-02-11 | Stop reason: HOSPADM

## 2020-02-10 RX ORDER — HYDRALAZINE HYDROCHLORIDE 20 MG/ML
20 INJECTION INTRAMUSCULAR; INTRAVENOUS
Status: DISCONTINUED | OUTPATIENT
Start: 2020-02-10 | End: 2020-02-11 | Stop reason: HOSPADM

## 2020-02-10 RX ADMIN — GABAPENTIN 600 MG: 300 CAPSULE ORAL at 09:15

## 2020-02-10 RX ADMIN — GABAPENTIN 600 MG: 300 CAPSULE ORAL at 21:26

## 2020-02-10 RX ADMIN — POTASSIUM CHLORIDE 10 MEQ: 10 TABLET, EXTENDED RELEASE ORAL at 09:15

## 2020-02-10 RX ADMIN — HYDRALAZINE HYDROCHLORIDE 20 MG: 20 INJECTION INTRAMUSCULAR; INTRAVENOUS at 02:24

## 2020-02-10 RX ADMIN — SODIUM CHLORIDE 150 ML/HR: 900 INJECTION, SOLUTION INTRAVENOUS at 23:17

## 2020-02-10 RX ADMIN — SODIUM BICARBONATE 650 MG TABLET 650 MG: at 18:15

## 2020-02-10 RX ADMIN — LEVOTHYROXINE SODIUM 50 MCG: 0.03 TABLET ORAL at 09:15

## 2020-02-10 RX ADMIN — QUETIAPINE FUMARATE 100 MG: 25 TABLET ORAL at 21:25

## 2020-02-10 RX ADMIN — GABAPENTIN 600 MG: 300 CAPSULE ORAL at 18:15

## 2020-02-10 RX ADMIN — SODIUM BICARBONATE 650 MG TABLET 650 MG: at 12:17

## 2020-02-10 RX ADMIN — CEFTRIAXONE 1 G: 1 INJECTION, POWDER, FOR SOLUTION INTRAMUSCULAR; INTRAVENOUS at 09:17

## 2020-02-10 RX ADMIN — SODIUM BICARBONATE 650 MG TABLET 650 MG: at 21:26

## 2020-02-10 RX ADMIN — HEPARIN SODIUM 5000 UNITS: 5000 INJECTION INTRAVENOUS; SUBCUTANEOUS at 12:17

## 2020-02-10 RX ADMIN — SODIUM CHLORIDE 150 ML/HR: 900 INJECTION, SOLUTION INTRAVENOUS at 16:30

## 2020-02-10 RX ADMIN — PANTOPRAZOLE SODIUM 40 MG: 40 TABLET, DELAYED RELEASE ORAL at 09:16

## 2020-02-10 RX ADMIN — HEPARIN SODIUM 5000 UNITS: 5000 INJECTION INTRAVENOUS; SUBCUTANEOUS at 18:17

## 2020-02-10 RX ADMIN — ATORVASTATIN CALCIUM 40 MG: 40 TABLET, FILM COATED ORAL at 09:15

## 2020-02-10 RX ADMIN — AMITRIPTYLINE HYDROCHLORIDE 10 MG: 10 TABLET, FILM COATED ORAL at 21:25

## 2020-02-10 RX ADMIN — LISINOPRIL 5 MG: 5 TABLET ORAL at 09:16

## 2020-02-10 NOTE — PROGRESS NOTES
1915 - Assumed pt care from Pola Villa RN. Pt in bed, alert and oriented x 4. Not in any form of distress. Denies pain. Frequent use items and call bell within reach. Verbalized understanding to call for assistance. Bed locked in lowest position. Patient has an elevated BP of 180/93. Notified Dr. Aj Grayson. Order received to administer Hydralazine 20 mg IV if SBP > 160 Q6H. Will continue to monitor patient. Rechecked BP - 157/89. Rechecked BP - 167/103. Administered Hydralazine 20 mg IV. Rechecked BP - 130/65. Bedside and Verbal shift change report given to Pola Villa RN (oncoming nurse) by Kirsten Martinez RN (offgoing nurse). Report included the following information SBAR, Kardex, Intake/Output, MAR and Recent Results.

## 2020-02-10 NOTE — PROGRESS NOTES
Problem: Falls - Risk of  Goal: *Absence of Falls  Description  Document Elke Salazar Fall Risk and appropriate interventions in the flowsheet. Outcome: Progressing Towards Goal  Note: Fall Risk Interventions:  Mobility Interventions: Bed/chair exit alarm         Medication Interventions: Evaluate medications/consider consulting pharmacy, Patient to call before getting OOB    Elimination Interventions: Call light in reach    History of Falls Interventions: Evaluate medications/consider consulting pharmacy, Investigate reason for fall         Problem: Patient Education: Go to Patient Education Activity  Goal: Patient/Family Education  Outcome: Progressing Towards Goal     Problem: Pain  Goal: *Control of Pain  Outcome: Progressing Towards Goal     Problem: Patient Education: Go to Patient Education Activity  Goal: Patient/Family Education  Outcome: Progressing Towards Goal     Problem: Risk for Spread of Infection  Goal: Prevent transmission of infectious organism to others  Description  Prevent the transmission of infectious organisms to other patients, staff members, and visitors.   Outcome: Progressing Towards Goal

## 2020-02-10 NOTE — PROGRESS NOTES
Problem: Falls - Risk of  Goal: *Absence of Falls  Description  Document Patricia Keith Fall Risk and appropriate interventions in the flowsheet. 2/9/2020 2101 by Alysha Sexton RN  Outcome: Progressing Towards Goal  Note: Fall Risk Interventions:  Mobility Interventions: Bed/chair exit alarm         Medication Interventions: Evaluate medications/consider consulting pharmacy    Elimination Interventions: Call light in reach    History of Falls Interventions: Evaluate medications/consider consulting pharmacy      2/9/2020 2031 by Alysha Sexton RN  Outcome: Progressing Towards Goal  Note: Fall Risk Interventions:  Mobility Interventions: Bed/chair exit alarm         Medication Interventions: Evaluate medications/consider consulting pharmacy, Patient to call before getting OOB    Elimination Interventions: Call light in reach    History of Falls Interventions: Evaluate medications/consider consulting pharmacy, Investigate reason for fall         Problem: Patient Education: Go to Patient Education Activity  Goal: Patient/Family Education  2/9/2020 2101 by Alysha Sexton RN  Outcome: Progressing Towards Goal  2/9/2020 2031 by Alysha Sexton RN  Outcome: Progressing Towards Goal     Problem: Pain  Goal: *Control of Pain  2/9/2020 2101 by Alysha Sexton RN  Outcome: Progressing Towards Goal  2/9/2020 2031 by Alysha Setxon RN  Outcome: Progressing Towards Goal     Problem: Patient Education: Go to Patient Education Activity  Goal: Patient/Family Education  2/9/2020 2101 by Alysha Sexton RN  Outcome: Progressing Towards Goal  2/9/2020 2031 by Alysha Sexton RN  Outcome: Progressing Towards Goal     Problem: Risk for Spread of Infection  Goal: Prevent transmission of infectious organism to others  Description  Prevent the transmission of infectious organisms to other patients, staff members, and visitors.   2/9/2020 2101 by Alysha Sexton RN  Outcome: Progressing Towards Goal  2/9/2020 2031 by Rachna Garza RN  Outcome: Progressing Towards Goal     Problem: Patient Education:  Go to Education Activity  Goal: Patient/Family Education  2/9/2020 2101 by Rachna Garza RN  Outcome: Progressing Towards Goal  2/9/2020 2031 by Rachna Garza RN  Outcome: Progressing Towards Goal     Problem: General Medical Care Plan  Goal: *Vital signs within specified parameters  2/9/2020 2101 by Rachna Garza RN  Outcome: Progressing Towards Goal  2/9/2020 2031 by Rachna Garza RN  Outcome: Progressing Towards Goal  Goal: *Labs within defined limits  2/9/2020 2101 by Rachna Garza RN  Outcome: Progressing Towards Goal  2/9/2020 2031 by Rachna Garza RN  Outcome: Progressing Towards Goal  Goal: *Absence of infection signs and symptoms  2/9/2020 2101 by Rachna Garza RN  Outcome: Progressing Towards Goal  2/9/2020 2031 by Rachna Garza RN  Outcome: Progressing Towards Goal  Goal: *Optimal pain control at patient's stated goal  2/9/2020 2101 by Rachna Garza RN  Outcome: Progressing Towards Goal  2/9/2020 2031 by Rachna Garza RN  Outcome: Progressing Towards Goal  Goal: *Skin integrity maintained  2/9/2020 2101 by Rachna Garza RN  Outcome: Progressing Towards Goal  2/9/2020 2031 by Rachna Garza RN  Outcome: Progressing Towards Goal  Goal: *Fluid volume balance  2/9/2020 2101 by Rachna Garza RN  Outcome: Progressing Towards Goal  2/9/2020 2031 by Rachna Garza RN  Outcome: Progressing Towards Goal  Goal: *Optimize nutritional status  2/9/2020 2101 by Rachna Garza RN  Outcome: Progressing Towards Goal  2/9/2020 2031 by Rachna Garza RN  Outcome: Progressing Towards Goal  Goal: *Anxiety reduced or absent  2/9/2020 2101 by Rachna Garza RN  Outcome: Progressing Towards Goal  2/9/2020 2031 by Rachna Garza RN  Outcome: Progressing Towards Goal  Goal: *Progressive mobility and function (eg: ADL's)  2/9/2020 2101 by Kimberley Nolasco RN  Outcome: Progressing Towards Goal  2/9/2020 2031 by Kimberley Nolasco RN  Outcome: Progressing Towards Goal

## 2020-02-10 NOTE — CDMP QUERY
Pt admitted with AMS. Pt noted to have likely Metabolic encephalopathy in  H&P . If possible, please document in the progress notes and d/c summary if you are evaluating and / or treating any of the following: ? Metabolic Encephalopay POA currently resolved ? Encephalopathy due to medications or drugs (please specify) POA currently reloved ? Toxic Metabolic Encephalopathy POA currently resolved 
? Other Encephalopathy 
? Other, please specify ? Clinically unable to determine The medical record reflects the following: 
 
   Risk Factors: 60 yo female transferred for OBX, with AMS Clinical Indicators: Per H&P Patient is admitted to [facility] Stepdown Unit for management of Altered Mental Status (likely Metabolic Encephalopathy) with Febrile Illness of Unknown Origin, and Rhabdomyolysis. 
  
Per H&P . ..upon sleeping, Nursing Staff reports that Patient is hypersomnolent and cannot easily be roused and only maintains partial focus momentarily before falling asleep again. *CK on adm 9469 Treatment: IV fluids,  Serial labs, close nursing observation Thank you, 
Marlen Reyes RN [phone]

## 2020-02-10 NOTE — PROGRESS NOTES
Internal Medicine Progress Note        NAME: eRdd Stephens   :  1955  MRM:  [id number]    Date/Time: 2/10/2020        ASSESSMENT/PLAN:    Principal Problem:    Altered mental status (2020)    Active Problems:    Hypothyroidism ()      Diabetes ([facility] [address] ()      Migraine ()      Hypertension (2015)      Rhabdomyolysis (2020)      MIKE (acute kidney injury) ([facility] [address] (2020)      History of CVA (cerebrovascular accident) (2020)      UTI (urinary tract infection) (2020)         AMS  - resolved  - afebrile, no s/sx meningitis  - d/c vanc and decadron  -  rocephin for UTI     Rhabdo  - IV fluids  - daily CK  - monitor BMP    # Metabolic acidosis : possible IVF related. NACHO3 tab , serial labs     MIKE  - resolved     UTI  - cont rocephin    -Code status : FULL    Dw her daughter on the phone , Q answered. [phone]    Lab Review:     Recent Labs     20  0701   WBC 5.0   HGB 10.4*   HCT 32.4*        Recent Labs     02/10/20  0455 20  2110 20  0350    143 143   K 3.7 4.2 4.4   * 116* 115*   CO2 18* 21 20*   * 134* 145*   BUN 21* 20* 21*   CREA 1.30 1.29 1.29   CA 8.2* 7.8* 8.1*   ALB 2.6* 2.5* 2.7*   TBILI 0.3 0.2 0.2   SGOT 245* 242* 275*   ALT 84* 76* 76*     Lab Results   Component Value Date/Time    Glucose (POC) 98 02/10/2020 07:25 AM    Glucose (POC) 139 (H) 2020 10:05 PM    Glucose (POC) 103 2020 04:22 PM    Glucose (POC) 142 (H) 2020 12:34 PM    Glucose (POC) 96 2015 01:07 PM     No results for input(s): PH, PCO2, PO2, HCO3, FIO2 in the last 72 hours. No results for input(s): INR, INREXT in the last 72 hours.     No results found for: SDES  Lab Results   Component Value Date/Time    Culture result: NO GROWTH 2 DAYS 2020 09:15 AM    Culture result: NO GROWTH 2 DAYS 2020 09:00 AM       All Cardiac Markers in the last 24 hours:   Lab Results   Component Value Date/Time    CPK 5,075 (H) 02/10/2020 04:55 AM Subjective:     Chief Complaint:      Offer no complain     ROS:  (bold if positive,otherwise negative)    Fever/chills ,  Dysuria   Cough , Sputum , SOB/CHAIREZ , Chest Pain     Diarrhea ,Nausea/Vomit , Abd Pain , Constipation     Tolerating Diet                Objective:     Vitals:  Last 24hrs VS reviewed since prior progress note.  Most recent are:    Visit Vitals  /82   Pulse 86   Temp 98.2 °F (36.8 °C)   Resp 18   Ht 5' 3\" (1.6 m)   Wt 90.6 kg (199 lb 12.8 oz)   SpO2 100%   BMI 35.39 kg/m²     SpO2 Readings from Last 6 Encounters:   02/10/20 100%   12/14/15 100%            Intake/Output Summary (Last 24 hours) at 2/10/2020 0934  Last data filed at 2/9/2020 1742  Gross per 24 hour   Intake 480 ml   Output 300 ml   Net 180 ml          Physical Exam:   General Appearance: NAD, conversant  HENT: normocephalic/atraumatic, moist mucus membranes  Neck: No JVD, supple, no nuchal rigidity  Lungs:  CTA with normal respiratory effort  CV: RRR, no m/r/g  Abdomen: soft, non-tender, normal bowel sounds  Extremities:  no cyanosis, no peripheral edema  Neuro: No focal deficits, motor/sensory intact  Skin: Normal color, intact    Medications Reviewed: (see below)    Lab Data Reviewed: (see below)    ______________________________________________________________________    Medications:     Current Facility-Administered Medications   Medication Dose Route Frequency    hydrALAZINE (APRESOLINE) 20 mg/mL injection 20 mg  20 mg IntraVENous Q6H PRN    sodium bicarbonate tablet 650 mg  650 mg Oral TID    insulin lispro (HUMALOG) injection   SubCUTAneous QID AFTER MEALS    glucose chewable tablet 16 g  4 Tab Oral PRN    glucagon (GLUCAGEN) injection 1 mg  1 mg IntraMUSCular PRN    dextrose 10% infusion 125-250 mL  125-250 mL IntraVENous PRN    cefTRIAXone (ROCEPHIN) 1 g in 0.9% sodium chloride (MBP/ADV) 50 mL MBP  1 g IntraVENous Q24H    amitriptyline (ELAVIL) tablet 10 mg  10 mg Oral QHS    atorvastatin (LIPITOR) tablet 40 mg  40 mg Oral DAILY    gabapentin (NEURONTIN) capsule 600 mg  600 mg Oral TID    levothyroxine (SYNTHROID) tablet 50 mcg  50 mcg Oral ACB    lisinopril (PRINIVIL, ZESTRIL) tablet 5 mg  5 mg Oral DAILY    pantoprazole (PROTONIX) tablet 40 mg  40 mg Oral DAILY    QUEtiapine (SEROquel) tablet 100 mg  100 mg Oral QHS    potassium chloride (KLOR-CON) tablet 10 mEq  10 mEq Oral DAILY    ondansetron (ZOFRAN) injection 4 mg  4 mg IntraVENous Q4H PRN    acetaminophen (TYLENOL) tablet 650 mg  650 mg Oral Q6H PRN    docusate sodium (COLACE) capsule 100 mg  100 mg Oral BID PRN    melatonin tablet 12 mg  12 mg Oral QHS PRN    albuterol-ipratropium (DUO-NEB) 2.5 MG-0.5 MG/3 ML  3 mL Nebulization Q6H PRN    0.9% sodium chloride infusion  150 mL/hr IntraVENous CONTINUOUS    traMADol (ULTRAM) tablet 50 mg  50 mg Oral Q8H PRN    naloxone (NARCAN) injection 0.4 mg  0.4 mg IntraVENous PRN          Total time spent with patient: 35 minutes                  Care Plan discussed with: Patient, Family, Care Manager, Nursing Staff and >50% of time spent in counseling and coordination of care    Discussed:  Care Plan    Prophylaxis:  Hep SQ    Disposition:  Home w/Family             Attending Physician: Candelaria Olvera MD

## 2020-02-10 NOTE — PROGRESS NOTES
conducted an initial consultation and Spiritual Assessment for Anya Sauer, who is a 59 y.o.,female. Patients Primary Language is: [place]. According to the patients EMR Orthodoxy Affiliation is: No Catholic. The reason the Patient came to the hospital is:   Patient Active Problem List    Diagnosis Date Noted    History of CVA (cerebrovascular accident) 02/09/2020    UTI (urinary tract infection) 02/09/2020    Rhabdomyolysis 02/08/2020    Altered mental status 02/08/2020    AMS (altered mental status) 02/08/2020    MIKE (acute kidney injury) ([facility] [address] 02/08/2020    Intracerebral hemorrhage ([facility] [address] 12/14/2015    Hypertension 12/14/2015    Obesity, Class II, BMI 35-39.9, with comorbidity 12/14/2015    SAH (subarachnoid hemorrhage) ([facility] [address] 11/04/2015    Hypothyroidism     Diabetes ([facility] [address]     Migraine         The  provided the following Interventions:  Initiated a relationship of care and support with  patient in room 3001  Listened empathically as patient shared her story about being here and here hopes for the future. Provided information about Spiritual Care Services. Offered prayer and assurance of continued prayers on patients behalf. The following outcomes were achieved:  Patient shared limited information about his medical narrative and spiritual journey/beliefs. Patient processed feeling about current hospitalization. Patient expressed gratitude for pastoral care visit. Assessment:  Patient does not have any Buddhist/cultural needs that will affect patients preferences in health care. There are no further spiritual or Buddhist issues which require Spiritual Care Services interventions at this time. Plan:  Chaplains will continue to follow and will provide pastoral care on an as needed/requested basis    . Lv Sabillon   Spiritual Care   [phone]

## 2020-02-11 VITALS
BODY MASS INDEX: 35.4 KG/M2 | TEMPERATURE: 97.7 F | WEIGHT: 199.8 LBS | DIASTOLIC BLOOD PRESSURE: 103 MMHG | SYSTOLIC BLOOD PRESSURE: 146 MMHG | HEART RATE: 82 BPM | RESPIRATION RATE: 18 BRPM | OXYGEN SATURATION: 100 % | HEIGHT: 63 IN

## 2020-02-11 LAB
ALBUMIN SERPL-MCNC: 2.4 G/DL (ref 3.4–5)
ALBUMIN/GLOB SERPL: 0.9 {RATIO} (ref 0.8–1.7)
ALP SERPL-CCNC: 98 U/L (ref 45–117)
ALT SERPL-CCNC: 81 U/L (ref 13–56)
ANION GAP SERPL CALC-SCNC: 5 MMOL/L (ref 3–18)
AST SERPL-CCNC: 168 U/L (ref 10–38)
BASOPHILS # BLD: 0 K/UL (ref 0–0.1)
BASOPHILS NFR BLD: 0 % (ref 0–2)
BILIRUB SERPL-MCNC: 0.4 MG/DL (ref 0.2–1)
BUN SERPL-MCNC: 17 MG/DL (ref 7–18)
BUN/CREAT SERPL: 14 (ref 12–20)
CALCIUM SERPL-MCNC: 7.9 MG/DL (ref 8.5–10.1)
CHLORIDE SERPL-SCNC: 117 MMOL/L (ref 100–111)
CK SERPL-CCNC: 2449 U/L (ref 26–192)
CO2 SERPL-SCNC: 23 MMOL/L (ref 21–32)
CREAT SERPL-MCNC: 1.21 MG/DL (ref 0.6–1.3)
DIFFERENTIAL METHOD BLD: ABNORMAL
EOSINOPHIL # BLD: 0 K/UL (ref 0–0.4)
EOSINOPHIL NFR BLD: 0 % (ref 0–5)
ERYTHROCYTE [DISTWIDTH] IN BLOOD BY AUTOMATED COUNT: 14.8 % (ref 11.6–14.5)
GLOBULIN SER CALC-MCNC: 2.6 G/DL (ref 2–4)
GLUCOSE BLD STRIP.AUTO-MCNC: 83 MG/DL (ref 70–110)
GLUCOSE SERPL-MCNC: 102 MG/DL (ref 74–99)
HCT VFR BLD AUTO: 36.3 % (ref 35–45)
HGB BLD-MCNC: 11.4 G/DL (ref 12–16)
LYMPHOCYTES # BLD: 2.3 K/UL (ref 0.9–3.6)
LYMPHOCYTES NFR BLD: 32 % (ref 21–52)
MCH RBC QN AUTO: 28.6 PG (ref 24–34)
MCHC RBC AUTO-ENTMCNC: 31.4 G/DL (ref 31–37)
MCV RBC AUTO: 91 FL (ref 74–97)
MONOCYTES # BLD: 0.6 K/UL (ref 0.05–1.2)
MONOCYTES NFR BLD: 8 % (ref 3–10)
NEUTS SEG # BLD: 4.4 K/UL (ref 1.8–8)
NEUTS SEG NFR BLD: 60 % (ref 40–73)
PLATELET # BLD AUTO: 137 K/UL (ref 135–420)
PMV BLD AUTO: 11.2 FL (ref 9.2–11.8)
POTASSIUM SERPL-SCNC: 3.9 MMOL/L (ref 3.5–5.5)
PROT SERPL-MCNC: 5 G/DL (ref 6.4–8.2)
RBC # BLD AUTO: 3.99 M/UL (ref 4.2–5.3)
SODIUM SERPL-SCNC: 145 MMOL/L (ref 136–145)
WBC # BLD AUTO: 7.3 K/UL (ref 4.6–13.2)

## 2020-02-11 PROCEDURE — 80053 COMPREHEN METABOLIC PANEL: CPT

## 2020-02-11 PROCEDURE — 74011250636 HC RX REV CODE- 250/636: Performed by: PHYSICIAN ASSISTANT

## 2020-02-11 PROCEDURE — 36415 COLL VENOUS BLD VENIPUNCTURE: CPT

## 2020-02-11 PROCEDURE — 74011250637 HC RX REV CODE- 250/637: Performed by: INTERNAL MEDICINE

## 2020-02-11 PROCEDURE — 74011250636 HC RX REV CODE- 250/636: Performed by: INTERNAL MEDICINE

## 2020-02-11 PROCEDURE — 85025 COMPLETE CBC W/AUTO DIFF WBC: CPT

## 2020-02-11 PROCEDURE — 97162 PT EVAL MOD COMPLEX 30 MIN: CPT

## 2020-02-11 PROCEDURE — 74011000258 HC RX REV CODE- 258: Performed by: PHYSICIAN ASSISTANT

## 2020-02-11 PROCEDURE — 74011250636 HC RX REV CODE- 250/636: Performed by: HOSPITALIST

## 2020-02-11 PROCEDURE — 82962 GLUCOSE BLOOD TEST: CPT

## 2020-02-11 PROCEDURE — 82550 ASSAY OF CK (CPK): CPT

## 2020-02-11 RX ADMIN — GABAPENTIN 600 MG: 300 CAPSULE ORAL at 09:01

## 2020-02-11 RX ADMIN — SODIUM CHLORIDE 150 ML/HR: 900 INJECTION, SOLUTION INTRAVENOUS at 06:29

## 2020-02-11 RX ADMIN — LEVOTHYROXINE SODIUM 50 MCG: 0.03 TABLET ORAL at 06:30

## 2020-02-11 RX ADMIN — LISINOPRIL 5 MG: 5 TABLET ORAL at 09:01

## 2020-02-11 RX ADMIN — CEFTRIAXONE 1 G: 1 INJECTION, POWDER, FOR SOLUTION INTRAMUSCULAR; INTRAVENOUS at 08:59

## 2020-02-11 RX ADMIN — PANTOPRAZOLE SODIUM 40 MG: 40 TABLET, DELAYED RELEASE ORAL at 09:01

## 2020-02-11 RX ADMIN — POTASSIUM CHLORIDE 10 MEQ: 10 TABLET, EXTENDED RELEASE ORAL at 09:01

## 2020-02-11 RX ADMIN — HEPARIN SODIUM 5000 UNITS: 5000 INJECTION INTRAVENOUS; SUBCUTANEOUS at 02:03

## 2020-02-11 RX ADMIN — ATORVASTATIN CALCIUM 40 MG: 40 TABLET, FILM COATED ORAL at 09:01

## 2020-02-11 RX ADMIN — SODIUM BICARBONATE 650 MG TABLET 650 MG: at 09:01

## 2020-02-11 NOTE — PROGRESS NOTES
PHYSICAL THERAPY EVALUATION AND DISCHARGE    Patient: Chana Roman (63 y.o. female)  Date: 2/11/2020  Primary Diagnosis: Altered mental status [R41.82]  Febrile illness [R50.9]  Rhabdomyolysis [M62.82]  AMS (altered mental status) [R41.82]  Febrile illness [R50.9]  Rhabdomyolysis [M62.82]  Precautions: Fall  PLOF: Mod I with cane   ASSESSMENT :  Pt was sitting at EOB at the start of the session. Mod I for sit to stand at EOB. Pt ambulated 300 feet mod I with cane. Step ups x6 bilaterally mod I with cane. Mod I for stand to sit at EOB. Pt reports no concerns about navigating home. Pt remained sitting at EOB at the end of the session. Education provided on bed mobility, transfers, ADLs, balance, amb, safety, exercise, role of PT, plan of care, cognition, skin integrity, vitals as indicated. Educated on need for RN assistance with mobility; verbalized understanding. Call bell in reach. Skilled physical therapy is not indicated at this time. PLAN :  Discharge Recommendations: None  Further Equipment Recommendations for Discharge: straight cane (has)      SUBJECTIVE:   Patient stated I feel good today.     OBJECTIVE DATA SUMMARY:     Past Medical History:   Diagnosis Date    Cerebral venous thrombosis     Depression     Diabetes mellitus, type 2 ([address]     Fibromyalgia     Hiatal hernia     HLD (hyperlipidemia)     Hypertension     Hypothyroidism     Insomnia     Intracerebral hemorrhage ([address] 03/2015    Hemorrhagic CVA;  Anterior Inferior Right Frontal Lobe    Migraine     Mild diastolic dysfunction 42/91/3587    by TTE    Obesity, Class II, BMI 35-39.9, with comorbidity 12/14/2015    DONNA (obstructive sleep apnea)     Patient has CPAP, but Non-Compliant    Subarachnoid hemorrhage ([address] 02/2015    Right Sylvian Fissure, Suprasellar, Prepontine, Ambient Cisterns, Right Lateral Convexity, & along the falx     Past Surgical History:   Procedure Laterality Date    HX CHOLECYSTECTOMY      HX COLONOSCOPY  2009    HX ENDOSCOPY  2010    EGD    HX HYSTERECTOMY      HX REFRACTIVE SURGERY Bilateral 1997     Barriers to Learning/Limitations: None  Compensate with: Visual Cues, Verbal Cues, Tactile Cues and Kinesthetic Cues  Home Situation:   Home Situation  Home Environment: Private residence  # Steps to Enter: 6  Rails to Enter: Yes  Hand Rails : Bilateral  One/Two Story Residence: One story  Living Alone: No  Support Systems: Family member(s)  Patient Expects to be Discharged to[de-identified] Private residence  Current DME Used/Available at Home: saritha Gustafson  Critical Behavior:  Neurologic State: Alert  Orientation Level: Oriented to person  Cognition: Follows commands  Safety/Judgement: Awareness of environment  Psychosocial  Patient Behaviors: Calm; Cooperative    Strength:    Manual Muscle Testing (LE) (functional mobility)          R     L    Hip Flexion:   3+/5  3+/5  Knee EXT:   3+/5  3+/5  Knee FLEX:   3+/5  3+/5  Ankle DF:   3+/5  3+/5  _________________________________________________   Range Of Motion:  RLE and LLE   WFL Bilaterally   Functional Mobility:  Transfers:  Sit to Stand: Modified independent  Stand to Sit: Modified independent  Balance:   Sitting: Intact  Standing: Impaired  Standing - Static: Good  Standing - Dynamic : Good  Ambulation/Gait Training:  Distance (ft): 300 Feet (ft)   Assistive Device: Cane, straight  Ambulation - Level of Assistance: Modified independent  Neuro Re-education:  Sitting balance at EOB.  Standing balance with cane   Therapeutic Exercises:   Step ups x6 bilaterally mod I   Pain:  Pain level pre-treatment: 0/10   Pain level post-treatment: 0/10    Activity Tolerance:   Good    After treatment:   []         Patient left in no apparent distress sitting up in chair  [x]         Patient left in no apparent distress in bed  [x]         Call bell left within reach  [x]         Nursing notified  []         Caregiver present  []         Bed alarm activated  []         SCDs applied    COMMUNICATION/EDUCATION:   [x]         Role of physical therapy in the acute care setting. [x]         Fall prevention education was provided and the patient/caregiver indicated understanding. [x]         Patient/family have participated as able in goal setting and plan of care. [x]         Patient/family agree to work toward stated goals and plan of care. []         Patient understands intent and goals of therapy, but is neutral about his/her participation. []         Patient is unable to participate in goal setting/plan of care: ongoing with therapy staff.     Thank you for this referral.  Nicola Joshi, SPT    Time Calculation: 9 mins    Eval Complexity: History: MEDIUM  Complexity : 1-2 comorbidities / personal factors will impact the outcome/ POC Exam:MEDIUM Complexity : 3 Standardized tests and measures addressing body structure, function, activity limitation and / or participation in recreation  Presentation: MEDIUM Complexity : Evolving with changing characteristics  Clinical Decision Making:Medium Complexity   Clinical judgement; ROM, MMT, functional mobility Overall Complexity:MEDIUM

## 2020-02-11 NOTE — ROUTINE PROCESS
Received verbal bedside report from [facility], RN, assumed care. Pt awake, alert and oriented x 4, denies pain,, No signs of distress noted at this time. Call bell within reach, bed in the lowest locked position.

## 2020-02-11 NOTE — PROGRESS NOTES
Problem: Falls - Risk of  Goal: *Absence of Falls  Description  Document Roger Annie Fall Risk and appropriate interventions in the flowsheet.   Outcome: Progressing Towards Goal  Note: Fall Risk Interventions:  Mobility Interventions: Communicate number of staff needed for ambulation/transfer, Patient to call before getting OOB, Utilize walker, cane, or other assistive device         Medication Interventions: Patient to call before getting OOB, Teach patient to arise slowly    Elimination Interventions: Call light in reach, Stay With Me (per policy)    History of Falls Interventions: Door open when patient unattended, Room close to nurse's station         Problem: Patient Education: Go to Patient Education Activity  Goal: Patient/Family Education  Outcome: Progressing Towards Goal     Problem: Pain  Goal: *Control of Pain  Outcome: Progressing Towards Goal     Problem: Patient Education: Go to Patient Education Activity  Goal: Patient/Family Education  Outcome: Progressing Towards Goal     Problem: General Medical Care Plan  Goal: *Vital signs within specified parameters  Outcome: Progressing Towards Goal  Goal: *Labs within defined limits  Outcome: Progressing Towards Goal  Goal: *Absence of infection signs and symptoms  Outcome: Progressing Towards Goal  Goal: *Optimal pain control at patient's stated goal  Outcome: Progressing Towards Goal  Goal: *Skin integrity maintained  Outcome: Progressing Towards Goal  Goal: *Fluid volume balance  Outcome: Progressing Towards Goal  Goal: *Optimize nutritional status  Outcome: Progressing Towards Goal  Goal: *Anxiety reduced or absent  Outcome: Progressing Towards Goal  Goal: *Progressive mobility and function (eg: ADL's)  Outcome: Progressing Towards Goal     Problem: Discharge Planning  Goal: *Discharge to safe environment  Outcome: Progressing Towards Goal     Problem: Urinary Tract Infection - Adult  Goal: *Absence of infection signs and symptoms  Outcome: Progressing Towards Goal     Problem: Patient Education: Go to Patient Education Activity  Goal: Patient/Family Education  Outcome: Progressing Towards Goal

## 2020-02-11 NOTE — ROUTINE PROCESS
2005: Assumed care. AAOX4. Behavior appropriate to situations. Assessment done. Denies any pain or discomfort at this time. Call light within reach. 2125: Due meds given. . No coverage provided per protocol. 2317: No change from previous assessment. 0203: Due med given. 3240: No change from previous assessment. 0630: Slept good thru night. Needs attended. Due med given. 0394: Bedside and Verbal shift change report given to Jai Mckeon (oncoming nurse) by me (offgoing nurse).  Report included the following information SBAR, Kardex, Intake/Output, MAR, Recent Results and Cardiac Rhythm SR.

## 2020-02-11 NOTE — DISCHARGE SUMMARY
2 [facility]  Hospitalist Division    Discharge Summary    Patient: Constanza Rosario MRN: [id number]  [facility]: [id number]    YOB: 1955  Age: 59 y.o. Sex: female    DOA: 2/8/2020 LOS:  LOS: 3 days   Discharge Date: 2/11/2020     Admission Diagnoses: Altered mental status [R41.82]  Febrile illness [R50.9]  Rhabdomyolysis [M62.82]  AMS (altered mental status) [R41.82]  Febrile illness [R50.9]  Rhabdomyolysis [M62.82]    Discharge Diagnoses:  Principal Problem:    Altered mental status (2/8/2020)    Active Problems:    Rhabdomyolysis (2/8/2020)      MIKE (acute kidney injury) ([facility] [address] (2/8/2020)      Hypothyroidism ()      Diabetes [facility] ()      Migraine ()      Hypertension (12/14/2015)      History of CVA (cerebrovascular accident) (2/9/2020)      UTI (urinary tract infection) (2/9/2020)        Discharge Condition: Stable    Discharge To: Home    Consults: None    HPI: Constanza Rosario is a 59 y.o. female who is accepted to [facility] Stepdown Unit as a Transfer from [facility] where Patient presented with complaints of AMS, Falls, and Fever. [facility] ICU Nursing Staff reports that Patient was talkative and exhibited full affect upon arrival and answered questions of Alertness and Orientation 3/3. However, upon sleeping, Nursing Staff reports that Patient is hypersomnolent and cannot easily be roused and only maintains partial focus momentarily before falling asleep again. Additional information cannot be solicited from Patient at this time.     [facility] ER Physician Caryle Must D. Zelphia Branch, M.D.) called to request Transfer for this Patient after Hospitalist(s) at his facility refused to admit Patient. Patient reportedly arrived at approximately 1800 EST on 2/07/2020 with a complaint of Altered Mental Status. Patient was noted to have Rhabdomyolysis with CK >6,000. Patient was noted to be Febrile (102.2°F) and UA, CXR, and CT Head were negative.   Reportedly, Patient's Family refused Lumbar Puncture on Patient's behalf and Patient underwent CTA Head & Neck as well as MRI Head and MRV. [facility] ER Physician reported a non-focal neurological examination and (-) Troponin. Notably, Patient reportedly had not had Antibiotics or Blood Cultures drawn during the phone conversation, which ended at approximately 0011 EST on 2/08/2020. [facility] ER Physician was told to inform Family Members that failure to have an LP could have disastrous effects if Patient did have a [address] infection. [facility] ER Physician did not convey Family's rationale for refusal of LP. Patient was accepted to be Transferred to [place] [address] Unit with instructions to administer empiric antibiotics for CNS Infection (IV Ceftriaxone 2 g, IV Vancomycin, and IV Dexasamethasone), as well as drawing Blood Cultures and checking a Serum Ammonia level. Additionally, Nursing Staff reports that Patient may only have received 500 mL of fluids while at X ER for treatment of Rhabdomyolysis with CK >6,000 and no running fluids thereafter or during transfer.     Notably, review of [facility] Medical Chart indicates that Family brought Patient in after being found down for an indeterminate amount of time and having aphasia. Notably, Patient has had a Cerebral Venous Clot, Hemorrhagic CVA, and Subdural Hematoma in the past and reportedly does have some word-searching. Patient received NeuroConsult in [facility] ER (which was not reported during Transfer) and Toxic Encephalopathy and Seizures were differential diagnoses for which Neurologist made provisions.     Patient is admitted to [place] [address] Unit for management of Altered Mental Status (likely Metabolic Encephalopathy) with Febrile Illness of Unknown Origin, and Rhabdomyolysis. Hospital Course: During admission at [facility], patient remained afebrile without leukocytosis. All cultures are negative. Encephalopathy resolved soon after she arrived. She had a negative ROS. CK trended down steadily with IV fluids.  VS and labs stable for discharge. Physical Exam:  General appearance: alert, cooperative, no distress, appears stated age  Head: Normocephalic, without obvious abnormality, atraumatic  Lungs: clear to auscultation bilaterally  Heart: regular rate and rhythm, S1, S2 normal, no murmur, click, rub or gallop  Abdomen: soft, non-tender. Bowel sounds normal. No masses,  no organomegaly  Extremities: no cyanosis or edema  Skin: Skin color, texture normal. No rashes or lesions  Neurologic: no focal deficits, motor/sensory intact  PSY: Mood and affect normal, appropriately behaved    Significant Diagnostic Studies:     BMP:   Lab Results   Component Value Date/Time     02/11/2020 05:55 AM    K 3.9 02/11/2020 05:55 AM     (H) 02/11/2020 05:55 AM    CO2 23 02/11/2020 05:55 AM    AGAP 5 02/11/2020 05:55 AM     (H) 02/11/2020 05:55 AM    BUN 17 02/11/2020 05:55 AM    CREA 1.21 02/11/2020 05:55 AM    GFRAA 54 (L) 02/11/2020 05:55 AM    GFRNA 45 (L) 02/11/2020 05:55 AM       Xr Chest Sngl V    Result Date: 2/8/2020  EXAM: CHEST RADIOGRAPH, SINGLE VIEW CLINICAL INDICATION/HISTORY: Altered level of consciousness, fever COMPARISON: 9/10/2015 TECHNIQUE: Portable frontal view of the chest was obtained. _______________ FINDINGS: SUPPORT DEVICES: None. HEART AND MEDIASTINUM: Cardiomediastinal silhouette appears within normal limits. Normal caliber thoracic aorta. No central vascular congestion. LUNGS AND PLEURAL SPACES: Lungs are well aerated with no confluent airspace opacity. No pleural effusion or pneumothorax. BONY THORAX AND SOFT TISSUES: No acute osseous abnormality. _______________     IMPRESSION: No active cardiopulmonary disease.       Procedures: None    Discharge Medications:     Discharge Medication List as of 2/11/2020 11:01 AM      CONTINUE these medications which have NOT CHANGED    Details   amitriptyline (ELAVIL) 10 mg tablet Take 10 mg by mouth nightly., Historical Med      baclofen (LIORESAL) 10 mg tablet Take 10-20 mg by mouth three (3) times daily. , Historical Med      oxyCODONE IR (ROXICODONE) 30 mg immediate release tablet Take 30 mg by mouth every six (6) hours as needed for Pain., Historical Med      lisinopril (PRINIVIL, ZESTRIL) 5 mg tablet Take 5 mg by mouth daily. , Historical Med      pantoprazole (PROTONIX) 40 mg tablet Take 40 mg by mouth daily. , Historical Med      potassium chloride SA (MICRO-K) 10 mEq capsule Take 10 mEq by mouth daily. , Historical Med      !! QUEtiapine (SEROQUEL) 100 mg tablet Take 100 mg by mouth nightly., Historical Med      zolpidem (AMBIEN) 10 mg tablet Take  by mouth nightly as needed for Sleep., Historical Med      venlafaxine (EFFEXOR) 37.5 mg tablet Take 37.5 mg by mouth daily. , Historical Med      fentaNYL (DURAGESIC) 25 mcg/hr PATCH 1 Patch by TransDERmal route every seventy-two (72) hours. , Historical Med      diazepam (VALIUM) 10 mg tablet Take 0.5 Tabs by mouth every six (6) hours as needed for Anxiety. Max Daily Amount: 20 mg., No Print, Disp-1 Tab, R-0      traMADol (ULTRAM) 50 mg tablet Take 50 mg by mouth every eight (8) hours as needed for Pain., Historical Med      ergocalciferol (VITAMIN D2) 50,000 unit capsule Take 50,000 Units by mouth every seven (7) days. , Historical Med      !! QUEtiapine (SEROQUEL) 50 mg tablet Take 50 mg by mouth daily. , Historical Med      levothyroxine (SYNTHROID) 50 mcg tablet Take  by mouth Daily (before breakfast). , Historical Med      gabapentin (NEURONTIN) 300 mg capsule Take 600 mg by mouth three (3) times daily. Indications: take two pills 3 times a day, Historical Med      esomeprazole (NEXIUM) 40 mg capsule Take 40 mg by mouth two (2) times a day., Historical Med      atorvastatin (LIPITOR) 40 mg tablet Take  by mouth daily. , Historical Med       !! - Potential duplicate medications found. Please discuss with provider.       STOP taking these medications       DULoxetine (CYMBALTA) 60 mg capsule Comments:   Reason for Stopping:                 Activity: Activity as tolerated    Diet: Cardiac Diet    Wound Care: None needed    Follow-up: 1 week with PCP    Discharge time: >35 minutes    [place] [address]  Office:  [phone]  Pager: [phone]      2/11/2020, 12:13 PM

## 2020-02-14 LAB
BACTERIA SPEC CULT: NORMAL
BACTERIA SPEC CULT: NORMAL
SERVICE CMNT-IMP: NORMAL
SERVICE CMNT-IMP: NORMAL

## 2020-02-19 NOTE — CDMP QUERY
Per Coding request: 
Pt admitted with AMS. Pt noted to have UTI documented. If possible, please document in the progress notes and d/c summary if patient was evaluated and or treated for any of the following: 
 
? Urinary Tract Infection (UTI) POA ? Bacteriuria POA ? Other, please specify ? Clinically unable to determine The medical record reflects the following: 
  Risk Factors: 58 yo female admitted with AMS Clinical Indicators: Per H&P on 2/8  UTI is not listed Per D/S 2/11 UTI documented Per D/S 2/11 \"During admission at [facility], patient remained afebrile without leukocytosis\" UA 2/8   Results for Yue Shelley (MRN [id number]) Ref. Range 2/8/2020 20:15 Color Latest Units:   YELLOW Appearance Latest Units:   CLEAR Specific gravity Latest Ref Range: 1.005 - 1.030   1.018  
pH (UA) Latest Ref Range: 5.0 - 8.0   7.0 Protein Latest Ref Range: NEG mg/dL NEGATIVE Glucose Latest Ref Range: NEG mg/dL 100 (A) Ketone Latest Ref Range: NEG mg/dL NEGATIVE Blood Latest Ref Range: NEG   NEGATIVE Bilirubin Latest Ref Range: NEG   NEGATIVE Urobilinogen Latest Ref Range: 0.2 - 1.0 EU/dL 0.2 Nitrites Latest Ref Range: NEG   NEGATIVE Leukocyte Esterase Latest Ref Range: NEG   TRACE (A) Epithelial cells Latest Ref Range: 0 - 5 /lpf 1+ WBC Latest Ref Range: 0 - 4 /hpf 0 to 3  
RBC Latest Ref Range: 0 - 5 /hpf 0 to 3 Bacteria Latest Ref Range: NEG /hpf 1+ (A) * No urine culture performed Treatment: Rocephin, IV fluids, U/A Thank you, 
Monica Gonzales RN -[phone]

## 2020-03-19 ENCOUNTER — IMPORTED ENCOUNTER (OUTPATIENT)
Dept: URBAN - NONMETROPOLITAN AREA CLINIC 1 | Facility: CLINIC | Age: 65
End: 2020-03-19

## 2020-03-19 PROCEDURE — 99213 OFFICE O/P EST LOW 20 MIN: CPT

## 2020-03-19 PROCEDURE — 92020 GONIOSCOPY: CPT

## 2020-03-19 NOTE — PATIENT DISCUSSION
Glaucoma Suspect-  Discussed diagnosis in detail with patient -  IOP today stable 12 13-  Gonio done today 3/19/2020 Grade 3 w/heavy pigment-  continue without drops at this time -  monitor 6 month Complete w/ 24-2 VF Cataracts OU -  discussed findings w/patient-  no treatment indicated at this time -  UV protection recommended-  monitor yearly or prn Type II DM w/o Retinopathy -  discussed findings w/patient -  controlled with diet at this time -  no retinopathy noted-  discussed the importance eof good blood sugar control and the negative effects of poorly controlled sugars on ocular health-  will send notes from today to Dr. Christina Sinclair -  monitor 6 month or prn; 's Notes: MR - June 2018DFE  6/2018VF 6/21/18OCT [facility] 04/26/19Optos/Fundus Photos Gonio 12/28/2018PACH  9/20/18 - Marylou Lombardi ([phone])

## 2020-09-17 ENCOUNTER — IMPORTED ENCOUNTER (OUTPATIENT)
Dept: URBAN - NONMETROPOLITAN AREA CLINIC 1 | Facility: CLINIC | Age: 65
End: 2020-09-17

## 2020-09-17 PROCEDURE — 92083 EXTENDED VISUAL FIELD XM: CPT

## 2020-09-17 PROCEDURE — 99214 OFFICE O/P EST MOD 30 MIN: CPT

## 2020-09-17 NOTE — PATIENT DISCUSSION
Glaucoma Suspect/Narrowing Angles-  Discussed diagnosis in detail with patient -  IOP today stable at 12 OU-  C/Ds noted at 0.45 OD and 0.5 OS-  PACH done 9/20/18:   -  Gonio done 3/19/2020:  Grade III w/heavy pigment-  VF 24-2 done 9/17/2020:      OD:  R full field no defects    OS:  R possible early superior nasal step vs. rim artifact worse than previous VF and need to repeat-  continue without drops at this time -  monitor 6 month w/ OCT GonioCataracts OU -  discussed findings w/patient-  no treatment indicated at this time -  UV protection recommended-  monitor yearly or prn Type II DM w/o Retinopathy -  discussed findings w/patient -  controlled with diet at this time -  no retinopathy noted-  discussed the importance eof good blood sugar control and the negative effects of poorly controlled sugars on ocular health-  will send notes from today to Dr. Sosa Pradhan -  monitor 6 month or prn NOTE:  MR was deferred by pt today no GLRx was given.; 's Notes: MR - June 2018DFE  6/201824-2 VF 9/17/2020OCT [facility] 04/26/19Optos/Fundus Photos Gonio 12/28/2018PACH  9/20/18 - Aly ([phone])

## 2020-11-10 NOTE — DISCHARGE INSTRUCTIONS
DISCHARGE SUMMARY from Nurse    PATIENT INSTRUCTIONS:    After general anesthesia or intravenous sedation, for 24 hours or while taking prescription Narcotics:  · Limit your activities  · Do not drive and operate hazardous machinery  · Do not make important personal or business decisions  · Do  not drink alcoholic beverages  · If you have not urinated within 8 hours after discharge, please contact your surgeon on call. Report the following to your surgeon:  · Excessive pain, swelling, redness or odor of or around the surgical area  · Temperature over 100.5  · Nausea and vomiting lasting longer than 4 hours or if unable to take medications  · Any signs of decreased circulation or nerve impairment to extremity: change in color, persistent  numbness, tingling, coldness or increase pain  · Any questions    What to do at Home:  Recommended activity: Activity as tolerated    If you experience any of the following symptoms change in alertness/orientation, fever, chills, or change in urine odor/color, please follow up with primary care physician/emergency room. *  Please give a list of your current medications to your Primary Care Provider. *  Please update this list whenever your medications are discontinued, doses are      changed, or new medications (including over-the-counter products) are added. *  Please carry medication information at all times in case of emergency situations. These are general instructions for a healthy lifestyle:    No smoking/ No tobacco products/ Avoid exposure to second hand smoke  Surgeon General's Warning:  Quitting smoking now greatly reduces serious risk to your health.     Obesity, smoking, and sedentary lifestyle greatly increases your risk for illness    A healthy diet, regular physical exercise & weight monitoring are important for maintaining a healthy lifestyle    You may be retaining fluid if you have a history of heart failure or if you experience any of the following symptoms:  Weight gain of 3 pounds or more overnight or 5 pounds in a week, increased swelling in our hands or feet or shortness of breath while lying flat in bed. Please call your doctor as soon as you notice any of these symptoms; do not wait until your next office visit. The discharge information has been reviewed with the patient. The patient verbalized understanding. Discharge medications reviewed with the patient and appropriate educational materials and side effects teaching were provided. Patient armband removed and shredded. Fall with Harm Risk

## 2021-03-15 ENCOUNTER — IMPORTED ENCOUNTER (OUTPATIENT)
Dept: URBAN - NONMETROPOLITAN AREA CLINIC 1 | Facility: CLINIC | Age: 66
End: 2021-03-15

## 2021-03-15 PROCEDURE — 92133 CPTRZD OPH DX IMG PST SGM ON: CPT

## 2021-03-15 PROCEDURE — 99213 OFFICE O/P EST LOW 20 MIN: CPT

## 2021-03-15 NOTE — PATIENT DISCUSSION
Glaucoma Suspect/Narrowing Angles-  Discussed diagnosis in detail with patient -  IOP today stable at 12 13 stable-  C/Ds noted at 0.45 OD and 0.5 OS-  PACH done 9/20/18:   -  Gonio done 3/19/2020:  Grade III w/heavy pigment-  VF 24-2 done 9/17/2020:      OD:  R full field no defects    OS:  R possible early superior nasal step vs. 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 Kaylah Torres -  monitor 6 month or prn NOTE:  MR was deferred by pt today no GLRx was given.; 's Notes: MR - June 2018DFE  6/201824-2 VF 9/17/2020[facility] 3/15/2021Optos/Fundus Photos Gonio 12/28/2018PACH  9/20/18 - Aly ([phone])

## 2021-03-15 NOTE — PATIENT DISCUSSION
Glaucoma Suspect/Narrowing Angles-  Discussed diagnosis in detail with patient -  IOP today stable at 12 13 stable-  C/Ds noted at 0.45 OD and 0.5 OS-  PACH done 9/20/18:   -  Gonio done 3/19/2020:  Grade III w/heavy pigment-  VF 24-2 done 9/17/2020:      OD:  R full field no defects    OS:  R possible early superior nasal step vs. 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 Christina Sinclair -  monitor 6 month or prn NOTE:  MR was deferred by pt 9/17/2020 no GLRx was given.; 's Notes: MR - June 2018DFE  6/201824-2 VF 9/17/2020OCT [facility] 3/15/2021Optos/Fundus Photos Gonio 12/28/2018PACH  9/20/18 - Aly ([phone])

## 2021-09-13 ENCOUNTER — IMPORTED ENCOUNTER (OUTPATIENT)
Dept: URBAN - NONMETROPOLITAN AREA CLINIC 1 | Facility: CLINIC | Age: 66
End: 2021-09-13

## 2021-09-13 ENCOUNTER — PREPPED CHART (OUTPATIENT)
Dept: URBAN - NONMETROPOLITAN AREA CLINIC 4 | Facility: CLINIC | Age: 66
End: 2021-09-13

## 2021-09-13 PROBLEM — E11.9: Noted: 2021-09-13

## 2021-09-13 PROBLEM — H16.223: Noted: 2021-09-13

## 2021-09-13 PROBLEM — H40.033: Noted: 2021-09-13

## 2021-09-13 PROBLEM — H52.4: Noted: 2021-09-13

## 2021-09-13 PROBLEM — H25.13: Noted: 2021-09-13

## 2021-09-13 PROCEDURE — 92014 COMPRE OPH EXAM EST PT 1/>: CPT

## 2021-09-13 PROCEDURE — 92015 DETERMINE REFRACTIVE STATE: CPT

## 2021-09-13 NOTE — PATIENT DISCUSSION
Compound Hyperopic Astigmatism w/Presbyopia - discussed findings w/patient - new spectacle Rx issued - continue to monitorGlaucoma Suspect/Narrowing Angles-  Discussed diagnosis in detail with patient -  IOP today stable at 13 13-  C/Ds noted at 0.45 OD and 0.5 OS-  PACH done 9/20/18:   -  Gonio done 3/19/2020:  Grade III w/heavy pigment-  VF 24-2 done 9/17/2020:      OD:  R full field no defects    OS:  R possible early superior nasal step vs. 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 Leonidas Murry -  monitor 6 month or prn NOELLE OU-  discussed findings w/patient-  worsening signs/symptoms noted-  start Restasis BID OU Rx sent to [facility]-  continue AT's at least BID OU-  RTC as scheduled or prn; Dr's Notes: MR - June 2018DFE  6/201824-2 VF 9/17/2020OCT [facility] 3/15/2021Optos/Fundus Photos Gonio 12/28/2018PACH  9/20/18 - Aly ([phone])

## 2022-03-11 ASSESSMENT — VISUAL ACUITY
OD_SC: 20/30-1
OS_SC: 20/40-2
OD_BAT: 20/40
OS_BAT: 20/40-2

## 2022-03-11 ASSESSMENT — TONOMETRY
OD_IOP_MMHG: 13
OS_IOP_MMHG: 13

## 2022-04-06 ENCOUNTER — FOLLOW UP (OUTPATIENT)
Dept: URBAN - NONMETROPOLITAN AREA CLINIC 4 | Facility: CLINIC | Age: 67
End: 2022-04-06

## 2022-04-06 DIAGNOSIS — H40.013: ICD-10-CM

## 2022-04-06 PROCEDURE — 92083 EXTENDED VISUAL FIELD XM: CPT

## 2022-04-06 PROCEDURE — 99213 OFFICE O/P EST LOW 20 MIN: CPT

## 2022-04-06 ASSESSMENT — TONOMETRY
OS_IOP_MMHG: 14
OD_IOP_MMHG: 14

## 2022-04-06 ASSESSMENT — VISUAL ACUITY
OS_SC: 20/40
OD_PH: 20/25
OS_PH: 20/25-2
OD_SC: 20/40

## 2022-04-06 NOTE — PATIENT DISCUSSION
Continue Restasis BID OU.  Patient will start AT's as well.  If dryness is not better with additon of AT's will try to get Cequa covered.

## 2022-04-15 ASSESSMENT — PACHYMETRY
OD_CT_UM: 500; ADJ: VTHIN
OS_CT_UM: 492; ADJ: VTHIN
OS_CT_UM: 492; ADJ: VTHIN
OD_CT_UM: 500; ADJ: VTHIN
OD_CT_UM: 500; ADJ: VTHIN
OS_CT_UM: 492; ADJ: VTHIN
OD_CT_UM: 500; ADJ: VTHIN
OD_CT_UM: 500; ADJ: VTHIN
OS_CT_UM: 492; ADJ: VTHIN
OD_CT_UM: 500; ADJ: VTHIN

## 2022-04-15 ASSESSMENT — VISUAL ACUITY
OD_CC: 20/30
OD_SC: 20/25-1
OS_CC: 20/40-2
OS_CC: 20/30
OD_CC: 20/30
OS_CC: 20/40
OS_PH: 20/25
OU_CC: 20/25+
OS_SC: 20/30-1
OU_CC: 20/20
OS_CC: 20/40-
OU_CC: 20/30
OD_CC: 20/20
OS_CC: 20/20
OD_CC: 20/30-
OD_GLARE: 20/40
OD_CC: 20/30
OS_GLARE: 20/40-2

## 2022-04-15 ASSESSMENT — TONOMETRY
OD_IOP_MMHG: 13
OS_IOP_MMHG: 09
OD_IOP_MMHG: 12
OD_IOP_MMHG: 12
OS_IOP_MMHG: 13
OS_IOP_MMHG: 12
OD_IOP_MMHG: 12
OS_IOP_MMHG: 13
OS_IOP_MMHG: 13
OD_IOP_MMHG: 07
OD_IOP_MMHG: 12
OS_IOP_MMHG: 13

## 2023-08-28 ENCOUNTER — ESTABLISHED PATIENT (OUTPATIENT)
Dept: RURAL CLINIC 1 | Facility: CLINIC | Age: 68
End: 2023-08-28

## 2023-08-28 DIAGNOSIS — Z98.890: ICD-10-CM

## 2023-08-28 DIAGNOSIS — H40.013: ICD-10-CM

## 2023-08-28 DIAGNOSIS — H16.223: ICD-10-CM

## 2023-08-28 DIAGNOSIS — H40.033: ICD-10-CM

## 2023-08-28 DIAGNOSIS — H25.13: ICD-10-CM

## 2023-08-28 DIAGNOSIS — E11.9: ICD-10-CM

## 2023-08-28 PROCEDURE — 92015 DETERMINE REFRACTIVE STATE: CPT

## 2023-08-28 PROCEDURE — 92014 COMPRE OPH EXAM EST PT 1/>: CPT

## 2023-08-28 ASSESSMENT — VISUAL ACUITY
OD_SC: 20/40
OD_CC: 20/20
OS_SC: 20/30
OU_SC: 20/30
OU_CC: 20/20
OS_CC: 20/20

## 2023-08-28 ASSESSMENT — TONOMETRY
OD_IOP_MMHG: 15
OS_IOP_MMHG: 15

## 2024-05-24 NOTE — PROGRESS NOTES
Discharge instructions provided to pt on behalf of primary nurse ELSA TITUS. No new prescriptions ordered. Recommended follow up appointments reviewed. Peripheral iv and telemetry monitor removed. Pt family at the bedside to take her home. 24-May-2024 04:00

## 2024-08-29 ENCOUNTER — COMPREHENSIVE EXAM (OUTPATIENT)
Dept: RURAL CLINIC 1 | Facility: CLINIC | Age: 69
End: 2024-08-29

## 2024-08-29 DIAGNOSIS — H16.223: ICD-10-CM

## 2024-08-29 DIAGNOSIS — H40.033: ICD-10-CM

## 2024-08-29 DIAGNOSIS — Z98.890: ICD-10-CM

## 2024-08-29 DIAGNOSIS — H25.13: ICD-10-CM

## 2024-08-29 DIAGNOSIS — H40.013: ICD-10-CM

## 2024-08-29 DIAGNOSIS — E11.9: ICD-10-CM

## 2024-08-29 PROCEDURE — 92133 CPTRZD OPH DX IMG PST SGM ON: CPT

## 2024-08-29 PROCEDURE — 92014 COMPRE OPH EXAM EST PT 1/>: CPT

## 2024-08-29 ASSESSMENT — VISUAL ACUITY
OD_SC: 20/40+2
OS_SC: 20/25

## 2024-08-29 ASSESSMENT — TONOMETRY
OD_IOP_MMHG: 17
OS_IOP_MMHG: 17